# Patient Record
Sex: MALE | Race: BLACK OR AFRICAN AMERICAN | Employment: FULL TIME
[De-identification: names, ages, dates, MRNs, and addresses within clinical notes are randomized per-mention and may not be internally consistent; named-entity substitution may affect disease eponyms.]

---

## 2017-01-16 RX ORDER — TIZANIDINE 4 MG/1
TABLET ORAL
Qty: 90 TABLET | Refills: 0 | Status: SHIPPED | OUTPATIENT
Start: 2017-01-16 | End: 2017-02-19 | Stop reason: SDUPTHER

## 2017-01-17 ENCOUNTER — OFFICE VISIT (OUTPATIENT)
Dept: FAMILY MEDICINE CLINIC | Facility: CLINIC | Age: 62
End: 2017-01-17

## 2017-01-17 VITALS
HEIGHT: 72 IN | SYSTOLIC BLOOD PRESSURE: 134 MMHG | DIASTOLIC BLOOD PRESSURE: 71 MMHG | WEIGHT: 215 LBS | BODY MASS INDEX: 29.12 KG/M2 | HEART RATE: 84 BPM

## 2017-01-17 DIAGNOSIS — R26.81 UNSTABLE GAIT: ICD-10-CM

## 2017-01-17 DIAGNOSIS — Z12.5 SPECIAL SCREENING FOR MALIGNANT NEOPLASM OF PROSTATE: ICD-10-CM

## 2017-01-17 DIAGNOSIS — G47.9 SLEEP DISORDER: ICD-10-CM

## 2017-01-17 DIAGNOSIS — E11.9 TYPE 2 DIABETES MELLITUS WITHOUT COMPLICATION, WITHOUT LONG-TERM CURRENT USE OF INSULIN (HCC): Primary | ICD-10-CM

## 2017-01-17 DIAGNOSIS — G89.4 CHRONIC PAIN SYNDROME: ICD-10-CM

## 2017-01-17 DIAGNOSIS — E78.2 MIXED HYPERLIPIDEMIA: ICD-10-CM

## 2017-01-17 DIAGNOSIS — I10 ESSENTIAL HYPERTENSION: ICD-10-CM

## 2017-01-17 DIAGNOSIS — S82.851S TRIMALLEOLAR FRACTURE OF RIGHT ANKLE, SEQUELA: ICD-10-CM

## 2017-01-17 PROCEDURE — 99214 OFFICE O/P EST MOD 30 MIN: CPT | Performed by: FAMILY MEDICINE

## 2017-01-17 PROCEDURE — G8484 FLU IMMUNIZE NO ADMIN: HCPCS | Performed by: FAMILY MEDICINE

## 2017-01-17 PROCEDURE — 3017F COLORECTAL CA SCREEN DOC REV: CPT | Performed by: FAMILY MEDICINE

## 2017-01-17 PROCEDURE — 1036F TOBACCO NON-USER: CPT | Performed by: FAMILY MEDICINE

## 2017-01-17 PROCEDURE — 3044F HG A1C LEVEL LT 7.0%: CPT | Performed by: FAMILY MEDICINE

## 2017-01-17 PROCEDURE — G8427 DOCREV CUR MEDS BY ELIG CLIN: HCPCS | Performed by: FAMILY MEDICINE

## 2017-01-17 PROCEDURE — G8419 CALC BMI OUT NRM PARAM NOF/U: HCPCS | Performed by: FAMILY MEDICINE

## 2017-01-17 RX ORDER — ZOLPIDEM TARTRATE 10 MG/1
10 TABLET ORAL NIGHTLY PRN
Qty: 30 TABLET | Refills: 0 | Status: SHIPPED | OUTPATIENT
Start: 2017-01-17 | End: 2017-04-18 | Stop reason: SDUPTHER

## 2017-02-03 ASSESSMENT — ENCOUNTER SYMPTOMS
SORE THROAT: 0
CONSTIPATION: 0
ABDOMINAL PAIN: 0
SHORTNESS OF BREATH: 0
DIARRHEA: 0
TROUBLE SWALLOWING: 0
RHINORRHEA: 0
NAUSEA: 0
COUGH: 0
CHEST TIGHTNESS: 0
BACK PAIN: 0

## 2017-02-08 ENCOUNTER — OFFICE VISIT (OUTPATIENT)
Dept: FAMILY MEDICINE CLINIC | Facility: CLINIC | Age: 62
End: 2017-02-08

## 2017-02-08 VITALS
DIASTOLIC BLOOD PRESSURE: 100 MMHG | SYSTOLIC BLOOD PRESSURE: 200 MMHG | HEART RATE: 90 BPM | BODY MASS INDEX: 28.71 KG/M2 | WEIGHT: 212 LBS | TEMPERATURE: 98.6 F | HEIGHT: 72 IN

## 2017-02-08 DIAGNOSIS — H92.02 OTALGIA OF LEFT EAR: ICD-10-CM

## 2017-02-08 DIAGNOSIS — E86.0 DEHYDRATION, MILD: ICD-10-CM

## 2017-02-08 DIAGNOSIS — J01.90 ACUTE RHINOSINUSITIS: ICD-10-CM

## 2017-02-08 DIAGNOSIS — J31.0: Primary | ICD-10-CM

## 2017-02-08 PROCEDURE — G8419 CALC BMI OUT NRM PARAM NOF/U: HCPCS | Performed by: FAMILY MEDICINE

## 2017-02-08 PROCEDURE — 1036F TOBACCO NON-USER: CPT | Performed by: FAMILY MEDICINE

## 2017-02-08 PROCEDURE — G8427 DOCREV CUR MEDS BY ELIG CLIN: HCPCS | Performed by: FAMILY MEDICINE

## 2017-02-08 PROCEDURE — G8484 FLU IMMUNIZE NO ADMIN: HCPCS | Performed by: FAMILY MEDICINE

## 2017-02-08 PROCEDURE — 99213 OFFICE O/P EST LOW 20 MIN: CPT | Performed by: FAMILY MEDICINE

## 2017-02-08 PROCEDURE — 3017F COLORECTAL CA SCREEN DOC REV: CPT | Performed by: FAMILY MEDICINE

## 2017-02-08 RX ORDER — OXYMETAZOLINE HYDROCHLORIDE 0.05 G/100ML
1 SPRAY NASAL 2 TIMES DAILY
Qty: 15 ML | Refills: 0 | Status: SHIPPED | OUTPATIENT
Start: 2017-02-08 | End: 2018-02-08

## 2017-02-08 RX ORDER — AMOXICILLIN AND CLAVULANATE POTASSIUM 875; 125 MG/1; MG/1
1 TABLET, FILM COATED ORAL 2 TIMES DAILY
Qty: 20 TABLET | Refills: 1 | Status: SHIPPED | OUTPATIENT
Start: 2017-02-08 | End: 2017-02-18

## 2017-02-08 ASSESSMENT — ENCOUNTER SYMPTOMS
RHINORRHEA: 1
COUGH: 0
NAUSEA: 0
DIARRHEA: 0
BACK PAIN: 1
FACIAL SWELLING: 1
SORE THROAT: 0
CONSTIPATION: 0
VOICE CHANGE: 0
SINUS PRESSURE: 1
ABDOMINAL PAIN: 0
TROUBLE SWALLOWING: 0
SHORTNESS OF BREATH: 0

## 2017-02-08 ASSESSMENT — PATIENT HEALTH QUESTIONNAIRE - PHQ9
2. FEELING DOWN, DEPRESSED OR HOPELESS: 0
1. LITTLE INTEREST OR PLEASURE IN DOING THINGS: 0
SUM OF ALL RESPONSES TO PHQ QUESTIONS 1-9: 0
SUM OF ALL RESPONSES TO PHQ9 QUESTIONS 1 & 2: 0

## 2017-02-21 RX ORDER — TIZANIDINE 4 MG/1
TABLET ORAL
Qty: 90 TABLET | Refills: 0 | Status: SHIPPED | OUTPATIENT
Start: 2017-02-21 | End: 2017-03-21 | Stop reason: SDUPTHER

## 2017-02-21 RX ORDER — AMLODIPINE BESYLATE 5 MG/1
TABLET ORAL
Qty: 90 TABLET | Refills: 2 | Status: SHIPPED | OUTPATIENT
Start: 2017-02-21 | End: 2018-04-23 | Stop reason: SDUPTHER

## 2017-03-21 RX ORDER — TIZANIDINE 4 MG/1
TABLET ORAL
Qty: 90 TABLET | Refills: 0 | Status: SHIPPED | OUTPATIENT
Start: 2017-03-21 | End: 2017-04-17 | Stop reason: CLARIF

## 2017-03-23 RX ORDER — LISINOPRIL 20 MG/1
TABLET ORAL
Qty: 240 TABLET | Refills: 3 | Status: SHIPPED | OUTPATIENT
Start: 2017-03-23 | End: 2018-04-23 | Stop reason: SDUPTHER

## 2017-04-17 ENCOUNTER — APPOINTMENT (OUTPATIENT)
Dept: GENERAL RADIOLOGY | Age: 62
End: 2017-04-17
Payer: COMMERCIAL

## 2017-04-17 ENCOUNTER — HOSPITAL ENCOUNTER (EMERGENCY)
Age: 62
Discharge: HOME | End: 2017-04-17
Attending: EMERGENCY MEDICINE
Payer: COMMERCIAL

## 2017-04-17 ENCOUNTER — APPOINTMENT (OUTPATIENT)
Dept: CT IMAGING | Age: 62
End: 2017-04-17
Payer: COMMERCIAL

## 2017-04-17 VITALS
SYSTOLIC BLOOD PRESSURE: 170 MMHG | HEART RATE: 62 BPM | TEMPERATURE: 97.7 F | RESPIRATION RATE: 18 BRPM | DIASTOLIC BLOOD PRESSURE: 71 MMHG | BODY MASS INDEX: 29.39 KG/M2 | OXYGEN SATURATION: 100 % | WEIGHT: 217 LBS | HEIGHT: 72 IN

## 2017-04-17 DIAGNOSIS — M54.50 LOW BACK PAIN WITHOUT SCIATICA, UNSPECIFIED BACK PAIN LATERALITY, UNSPECIFIED CHRONICITY: ICD-10-CM

## 2017-04-17 DIAGNOSIS — I10 ESSENTIAL HYPERTENSION: Primary | ICD-10-CM

## 2017-04-17 LAB
ABSOLUTE EOS #: 0 K/UL (ref 0–0.4)
ABSOLUTE LYMPH #: 0.9 K/UL (ref 1–4.8)
ABSOLUTE MONO #: 0.3 K/UL (ref 0.2–0.8)
ANION GAP SERPL CALCULATED.3IONS-SCNC: 14 MMOL/L (ref 9–17)
BASOPHILS # BLD: 0 % (ref 0–2)
BASOPHILS ABSOLUTE: 0 K/UL (ref 0–0.2)
BILIRUBIN URINE: NEGATIVE
BUN BLDV-MCNC: 11 MG/DL (ref 8–23)
BUN/CREAT BLD: 14 (ref 9–20)
CALCIUM SERPL-MCNC: 9.5 MG/DL (ref 8.6–10.4)
CHLORIDE BLD-SCNC: 98 MMOL/L (ref 98–107)
CHP ED QC CHECK: NORMAL
CO2: 26 MMOL/L (ref 20–31)
COLOR: YELLOW
COMMENT UA: ABNORMAL
CREAT SERPL-MCNC: 0.79 MG/DL (ref 0.7–1.2)
DIFFERENTIAL TYPE: ABNORMAL
EOSINOPHILS RELATIVE PERCENT: 0 % (ref 1–4)
GFR AFRICAN AMERICAN: >60 ML/MIN
GFR NON-AFRICAN AMERICAN: >60 ML/MIN
GFR SERPL CREATININE-BSD FRML MDRD: ABNORMAL ML/MIN/{1.73_M2}
GFR SERPL CREATININE-BSD FRML MDRD: ABNORMAL ML/MIN/{1.73_M2}
GLUCOSE BLD-MCNC: 176 MG/DL (ref 70–99)
GLUCOSE URINE: ABNORMAL
HCT VFR BLD CALC: 40.2 % (ref 41–53)
HEMOGLOBIN: 13.6 G/DL (ref 13.5–17.5)
KETONES, URINE: NEGATIVE
LEUKOCYTE ESTERASE, URINE: NEGATIVE
LYMPHOCYTES # BLD: 9 % (ref 24–44)
MCH RBC QN AUTO: 28.2 PG (ref 26–34)
MCHC RBC AUTO-ENTMCNC: 33.7 G/DL (ref 31–37)
MCV RBC AUTO: 83.6 FL (ref 80–100)
MONOCYTES # BLD: 3 % (ref 1–7)
NITRITE, URINE: NEGATIVE
PDW BLD-RTO: 12.7 % (ref 11.5–14.5)
PH UA: 5.5 (ref 5–8)
PLATELET # BLD: 283 K/UL (ref 130–400)
PLATELET ESTIMATE: ABNORMAL
PMV BLD AUTO: 7.7 FL (ref 6–12)
POTASSIUM SERPL-SCNC: 3.7 MMOL/L (ref 3.7–5.3)
PROTEIN UA: NEGATIVE
RBC # BLD: 4.81 M/UL (ref 4.5–5.9)
RBC # BLD: ABNORMAL 10*6/UL
SEG NEUTROPHILS: 88 % (ref 36–66)
SEGMENTED NEUTROPHILS ABSOLUTE COUNT: 8.1 K/UL (ref 1.8–7.7)
SODIUM BLD-SCNC: 138 MMOL/L (ref 135–144)
SPECIFIC GRAVITY UA: 1.02 (ref 1–1.03)
TURBIDITY: CLEAR
URINE HGB: NEGATIVE
UROBILINOGEN, URINE: NORMAL
WBC # BLD: 9.2 K/UL (ref 3.5–11)
WBC # BLD: ABNORMAL 10*3/UL

## 2017-04-17 PROCEDURE — 74176 CT ABD & PELVIS W/O CONTRAST: CPT

## 2017-04-17 PROCEDURE — 6360000002 HC RX W HCPCS: Performed by: PHYSICIAN ASSISTANT

## 2017-04-17 PROCEDURE — 85025 COMPLETE CBC W/AUTO DIFF WBC: CPT

## 2017-04-17 PROCEDURE — 96375 TX/PRO/DX INJ NEW DRUG ADDON: CPT

## 2017-04-17 PROCEDURE — 96374 THER/PROPH/DIAG INJ IV PUSH: CPT

## 2017-04-17 PROCEDURE — 80048 BASIC METABOLIC PNL TOTAL CA: CPT

## 2017-04-17 PROCEDURE — 96376 TX/PRO/DX INJ SAME DRUG ADON: CPT

## 2017-04-17 PROCEDURE — 72100 X-RAY EXAM L-S SPINE 2/3 VWS: CPT

## 2017-04-17 PROCEDURE — 99284 EMERGENCY DEPT VISIT MOD MDM: CPT

## 2017-04-17 RX ORDER — ONDANSETRON 2 MG/ML
4 INJECTION INTRAMUSCULAR; INTRAVENOUS ONCE
Status: COMPLETED | OUTPATIENT
Start: 2017-04-17 | End: 2017-04-17

## 2017-04-17 RX ORDER — HYDRALAZINE HYDROCHLORIDE 20 MG/ML
10 INJECTION INTRAMUSCULAR; INTRAVENOUS ONCE
Status: COMPLETED | OUTPATIENT
Start: 2017-04-17 | End: 2017-04-17

## 2017-04-17 RX ORDER — METHOCARBAMOL 750 MG/1
750 TABLET, FILM COATED ORAL 4 TIMES DAILY
Qty: 40 TABLET | Refills: 0 | Status: SHIPPED | OUTPATIENT
Start: 2017-04-17 | End: 2017-04-27

## 2017-04-17 RX ORDER — HYDRALAZINE HYDROCHLORIDE 20 MG/ML
10 INJECTION INTRAMUSCULAR; INTRAVENOUS EVERY 6 HOURS PRN
Status: DISCONTINUED | OUTPATIENT
Start: 2017-04-17 | End: 2017-04-17

## 2017-04-17 RX ADMIN — HYDROMORPHONE HYDROCHLORIDE 1 MG: 1 INJECTION, SOLUTION INTRAMUSCULAR; INTRAVENOUS; SUBCUTANEOUS at 12:20

## 2017-04-17 RX ADMIN — HYDROMORPHONE HYDROCHLORIDE 1 MG: 1 INJECTION, SOLUTION INTRAMUSCULAR; INTRAVENOUS; SUBCUTANEOUS at 11:48

## 2017-04-17 RX ADMIN — ONDANSETRON 4 MG: 2 INJECTION INTRAMUSCULAR; INTRAVENOUS at 11:48

## 2017-04-17 RX ADMIN — HYDRALAZINE HYDROCHLORIDE 10 MG: 20 INJECTION INTRAMUSCULAR; INTRAVENOUS at 13:02

## 2017-04-17 ASSESSMENT — PAIN SCALES - GENERAL
PAINLEVEL_OUTOF10: 5
PAINLEVEL_OUTOF10: 7
PAINLEVEL_OUTOF10: 10

## 2017-04-18 ENCOUNTER — OFFICE VISIT (OUTPATIENT)
Dept: FAMILY MEDICINE CLINIC | Age: 62
End: 2017-04-18
Payer: COMMERCIAL

## 2017-04-18 VITALS
WEIGHT: 205 LBS | BODY MASS INDEX: 27.77 KG/M2 | DIASTOLIC BLOOD PRESSURE: 80 MMHG | SYSTOLIC BLOOD PRESSURE: 140 MMHG | HEART RATE: 84 BPM | HEIGHT: 72 IN

## 2017-04-18 DIAGNOSIS — M54.42 ACUTE LEFT-SIDED LOW BACK PAIN WITH LEFT-SIDED SCIATICA: Primary | ICD-10-CM

## 2017-04-18 DIAGNOSIS — E11.9 TYPE 2 DIABETES MELLITUS WITHOUT COMPLICATION, WITHOUT LONG-TERM CURRENT USE OF INSULIN (HCC): ICD-10-CM

## 2017-04-18 DIAGNOSIS — I10 ESSENTIAL HYPERTENSION: ICD-10-CM

## 2017-04-18 DIAGNOSIS — M47.817 DJD (DEGENERATIVE JOINT DISEASE), LUMBOSACRAL: ICD-10-CM

## 2017-04-18 DIAGNOSIS — F41.9 ANXIETY: ICD-10-CM

## 2017-04-18 DIAGNOSIS — G47.9 SLEEP DISORDER: ICD-10-CM

## 2017-04-18 DIAGNOSIS — Z23 NEED FOR PNEUMOCOCCAL VACCINATION: ICD-10-CM

## 2017-04-18 LAB — HBA1C MFR BLD: 5.7 %

## 2017-04-18 PROCEDURE — 1036F TOBACCO NON-USER: CPT | Performed by: FAMILY MEDICINE

## 2017-04-18 PROCEDURE — G8419 CALC BMI OUT NRM PARAM NOF/U: HCPCS | Performed by: FAMILY MEDICINE

## 2017-04-18 PROCEDURE — G8427 DOCREV CUR MEDS BY ELIG CLIN: HCPCS | Performed by: FAMILY MEDICINE

## 2017-04-18 PROCEDURE — 90471 IMMUNIZATION ADMIN: CPT | Performed by: FAMILY MEDICINE

## 2017-04-18 PROCEDURE — 3044F HG A1C LEVEL LT 7.0%: CPT | Performed by: FAMILY MEDICINE

## 2017-04-18 PROCEDURE — 3017F COLORECTAL CA SCREEN DOC REV: CPT | Performed by: FAMILY MEDICINE

## 2017-04-18 PROCEDURE — 83036 HEMOGLOBIN GLYCOSYLATED A1C: CPT | Performed by: FAMILY MEDICINE

## 2017-04-18 PROCEDURE — 99214 OFFICE O/P EST MOD 30 MIN: CPT | Performed by: FAMILY MEDICINE

## 2017-04-18 PROCEDURE — 90732 PPSV23 VACC 2 YRS+ SUBQ/IM: CPT | Performed by: FAMILY MEDICINE

## 2017-04-18 RX ORDER — ZOLPIDEM TARTRATE 10 MG/1
10 TABLET ORAL NIGHTLY PRN
Qty: 30 TABLET | Refills: 1 | Status: SHIPPED | OUTPATIENT
Start: 2017-04-18 | End: 2017-06-19 | Stop reason: SDUPTHER

## 2017-04-18 RX ORDER — IBUPROFEN 800 MG/1
800 TABLET ORAL EVERY 8 HOURS PRN
Qty: 90 TABLET | Refills: 3 | Status: SHIPPED | OUTPATIENT
Start: 2017-04-18 | End: 2019-03-13 | Stop reason: ALTCHOICE

## 2017-04-18 ASSESSMENT — PATIENT HEALTH QUESTIONNAIRE - PHQ9
1. LITTLE INTEREST OR PLEASURE IN DOING THINGS: 0
SUM OF ALL RESPONSES TO PHQ9 QUESTIONS 1 & 2: 0
2. FEELING DOWN, DEPRESSED OR HOPELESS: 0
SUM OF ALL RESPONSES TO PHQ QUESTIONS 1-9: 0

## 2017-04-19 DIAGNOSIS — M54.42 ACUTE LEFT-SIDED LOW BACK PAIN WITH LEFT-SIDED SCIATICA: Primary | ICD-10-CM

## 2017-04-23 RX ORDER — TIZANIDINE 4 MG/1
TABLET ORAL
Qty: 90 TABLET | Refills: 1 | Status: SHIPPED | OUTPATIENT
Start: 2017-04-23 | End: 2018-04-23 | Stop reason: SDUPTHER

## 2017-04-23 ASSESSMENT — ENCOUNTER SYMPTOMS
RHINORRHEA: 0
DIARRHEA: 0
SHORTNESS OF BREATH: 0
NAUSEA: 0
ABDOMINAL PAIN: 0
TROUBLE SWALLOWING: 0
CHEST TIGHTNESS: 0
CONSTIPATION: 0
BACK PAIN: 1
SORE THROAT: 0
COUGH: 0

## 2017-05-27 DIAGNOSIS — M70.22 OLECRANON BURSITIS OF LEFT ELBOW: ICD-10-CM

## 2017-05-27 DIAGNOSIS — M47.817 DJD (DEGENERATIVE JOINT DISEASE), LUMBOSACRAL: ICD-10-CM

## 2017-05-27 DIAGNOSIS — G89.4 CHRONIC PAIN SYNDROME: ICD-10-CM

## 2017-05-27 DIAGNOSIS — S82.851S TRIMALLEOLAR FRACTURE OF RIGHT ANKLE, SEQUELA: ICD-10-CM

## 2017-05-28 RX ORDER — GABAPENTIN 600 MG/1
TABLET ORAL
Qty: 90 TABLET | Refills: 3 | Status: SHIPPED | OUTPATIENT
Start: 2017-05-28 | End: 2018-04-23 | Stop reason: SDUPTHER

## 2017-06-08 DIAGNOSIS — E11.9 TYPE 2 DIABETES MELLITUS WITHOUT COMPLICATION (HCC): ICD-10-CM

## 2017-06-19 ENCOUNTER — OFFICE VISIT (OUTPATIENT)
Dept: FAMILY MEDICINE CLINIC | Age: 62
End: 2017-06-19
Payer: COMMERCIAL

## 2017-06-19 VITALS
BODY MASS INDEX: 27.14 KG/M2 | DIASTOLIC BLOOD PRESSURE: 70 MMHG | WEIGHT: 200.4 LBS | SYSTOLIC BLOOD PRESSURE: 130 MMHG | HEIGHT: 72 IN | HEART RATE: 76 BPM

## 2017-06-19 DIAGNOSIS — S82.851G: Primary | ICD-10-CM

## 2017-06-19 DIAGNOSIS — G62.9 NEUROPATHY: ICD-10-CM

## 2017-06-19 DIAGNOSIS — R26.81 UNSTABLE GAIT: ICD-10-CM

## 2017-06-19 DIAGNOSIS — M47.817 DJD (DEGENERATIVE JOINT DISEASE), LUMBOSACRAL: ICD-10-CM

## 2017-06-19 DIAGNOSIS — G47.9 SLEEP DISORDER: ICD-10-CM

## 2017-06-19 DIAGNOSIS — I10 ESSENTIAL HYPERTENSION: ICD-10-CM

## 2017-06-19 DIAGNOSIS — E11.9 TYPE 2 DIABETES MELLITUS WITHOUT COMPLICATION, WITHOUT LONG-TERM CURRENT USE OF INSULIN (HCC): ICD-10-CM

## 2017-06-19 DIAGNOSIS — Z12.5 SPECIAL SCREENING FOR MALIGNANT NEOPLASM OF PROSTATE: ICD-10-CM

## 2017-06-19 DIAGNOSIS — E78.2 MIXED HYPERLIPIDEMIA: ICD-10-CM

## 2017-06-19 DIAGNOSIS — G89.4 CHRONIC PAIN SYNDROME: ICD-10-CM

## 2017-06-19 PROCEDURE — 99213 OFFICE O/P EST LOW 20 MIN: CPT | Performed by: FAMILY MEDICINE

## 2017-06-19 PROCEDURE — 1036F TOBACCO NON-USER: CPT | Performed by: FAMILY MEDICINE

## 2017-06-19 PROCEDURE — 3046F HEMOGLOBIN A1C LEVEL >9.0%: CPT | Performed by: FAMILY MEDICINE

## 2017-06-19 PROCEDURE — G8419 CALC BMI OUT NRM PARAM NOF/U: HCPCS | Performed by: FAMILY MEDICINE

## 2017-06-19 PROCEDURE — G8427 DOCREV CUR MEDS BY ELIG CLIN: HCPCS | Performed by: FAMILY MEDICINE

## 2017-06-19 PROCEDURE — 3017F COLORECTAL CA SCREEN DOC REV: CPT | Performed by: FAMILY MEDICINE

## 2017-06-19 RX ORDER — ZOLPIDEM TARTRATE 10 MG/1
10 TABLET ORAL NIGHTLY PRN
Qty: 30 TABLET | Refills: 1 | Status: SHIPPED | OUTPATIENT
Start: 2017-06-19 | End: 2017-09-11 | Stop reason: SDUPTHER

## 2017-06-19 ASSESSMENT — PATIENT HEALTH QUESTIONNAIRE - PHQ9
SUM OF ALL RESPONSES TO PHQ9 QUESTIONS 1 & 2: 0
2. FEELING DOWN, DEPRESSED OR HOPELESS: 0
1. LITTLE INTEREST OR PLEASURE IN DOING THINGS: 0
SUM OF ALL RESPONSES TO PHQ QUESTIONS 1-9: 0

## 2017-06-29 RX ORDER — AMOXICILLIN AND CLAVULANATE POTASSIUM 875; 125 MG/1; MG/1
TABLET, FILM COATED ORAL
Qty: 20 TABLET | Refills: 0 | Status: SHIPPED | OUTPATIENT
Start: 2017-06-29 | End: 2018-04-23 | Stop reason: SDUPTHER

## 2017-07-03 DIAGNOSIS — M54.42 ACUTE LEFT-SIDED LOW BACK PAIN WITH LEFT-SIDED SCIATICA: Primary | ICD-10-CM

## 2017-07-13 ASSESSMENT — ENCOUNTER SYMPTOMS
TROUBLE SWALLOWING: 0
NAUSEA: 0
ABDOMINAL PAIN: 0
SHORTNESS OF BREATH: 1
CONSTIPATION: 0
DIARRHEA: 0
SORE THROAT: 0
BACK PAIN: 0
RHINORRHEA: 0
COUGH: 0
CHEST TIGHTNESS: 0

## 2017-09-11 DIAGNOSIS — G47.9 SLEEP DISORDER: ICD-10-CM

## 2017-09-11 RX ORDER — ZOLPIDEM TARTRATE 10 MG/1
TABLET ORAL
Qty: 30 TABLET | Refills: 0 | Status: SHIPPED | OUTPATIENT
Start: 2017-09-11 | End: 2017-12-18 | Stop reason: SDUPTHER

## 2017-09-25 ENCOUNTER — OFFICE VISIT (OUTPATIENT)
Dept: FAMILY MEDICINE CLINIC | Age: 62
End: 2017-09-25
Payer: COMMERCIAL

## 2017-09-25 VITALS
BODY MASS INDEX: 28.01 KG/M2 | HEIGHT: 72 IN | DIASTOLIC BLOOD PRESSURE: 68 MMHG | TEMPERATURE: 98 F | WEIGHT: 206.8 LBS | RESPIRATION RATE: 16 BRPM | SYSTOLIC BLOOD PRESSURE: 132 MMHG | HEART RATE: 80 BPM

## 2017-09-25 DIAGNOSIS — I10 ESSENTIAL HYPERTENSION: Primary | ICD-10-CM

## 2017-09-25 DIAGNOSIS — M47.817 DJD (DEGENERATIVE JOINT DISEASE), LUMBOSACRAL: ICD-10-CM

## 2017-09-25 DIAGNOSIS — R26.81 UNSTABLE GAIT: ICD-10-CM

## 2017-09-25 DIAGNOSIS — E11.9 TYPE 2 DIABETES MELLITUS WITHOUT COMPLICATION, WITHOUT LONG-TERM CURRENT USE OF INSULIN (HCC): ICD-10-CM

## 2017-09-25 DIAGNOSIS — S82.851S TRIMALLEOLAR FRACTURE OF RIGHT ANKLE, SEQUELA: ICD-10-CM

## 2017-09-25 DIAGNOSIS — E78.2 MIXED HYPERLIPIDEMIA: ICD-10-CM

## 2017-09-25 DIAGNOSIS — Z12.5 SPECIAL SCREENING FOR MALIGNANT NEOPLASM OF PROSTATE: ICD-10-CM

## 2017-09-25 PROCEDURE — 1036F TOBACCO NON-USER: CPT | Performed by: FAMILY MEDICINE

## 2017-09-25 PROCEDURE — 3046F HEMOGLOBIN A1C LEVEL >9.0%: CPT | Performed by: FAMILY MEDICINE

## 2017-09-25 PROCEDURE — 3017F COLORECTAL CA SCREEN DOC REV: CPT | Performed by: FAMILY MEDICINE

## 2017-09-25 PROCEDURE — 99214 OFFICE O/P EST MOD 30 MIN: CPT | Performed by: FAMILY MEDICINE

## 2017-09-25 PROCEDURE — G8427 DOCREV CUR MEDS BY ELIG CLIN: HCPCS | Performed by: FAMILY MEDICINE

## 2017-09-25 PROCEDURE — G8417 CALC BMI ABV UP PARAM F/U: HCPCS | Performed by: FAMILY MEDICINE

## 2017-09-25 RX ORDER — MELOXICAM 15 MG/1
15 TABLET ORAL DAILY
Qty: 30 TABLET | Refills: 3 | Status: SHIPPED | OUTPATIENT
Start: 2017-09-25 | End: 2019-03-13 | Stop reason: ALTCHOICE

## 2017-09-25 ASSESSMENT — ENCOUNTER SYMPTOMS
TROUBLE SWALLOWING: 0
DIARRHEA: 0
SHORTNESS OF BREATH: 0
BACK PAIN: 1
ABDOMINAL PAIN: 0
CONSTIPATION: 0
SORE THROAT: 0
COUGH: 0
CHEST TIGHTNESS: 0
RHINORRHEA: 0
NAUSEA: 0

## 2017-10-21 ENCOUNTER — HOSPITAL ENCOUNTER (OUTPATIENT)
Age: 62
Discharge: HOME | End: 2017-10-21
Payer: COMMERCIAL

## 2017-10-21 DIAGNOSIS — E11.9 TYPE 2 DIABETES MELLITUS WITHOUT COMPLICATION, WITHOUT LONG-TERM CURRENT USE OF INSULIN (HCC): ICD-10-CM

## 2017-10-21 DIAGNOSIS — Z12.5 SPECIAL SCREENING FOR MALIGNANT NEOPLASM OF PROSTATE: ICD-10-CM

## 2017-10-21 DIAGNOSIS — I10 ESSENTIAL HYPERTENSION: ICD-10-CM

## 2017-10-21 DIAGNOSIS — E78.2 MIXED HYPERLIPIDEMIA: ICD-10-CM

## 2017-10-21 LAB
ABSOLUTE EOS #: 0.1 K/UL (ref 0–0.4)
ABSOLUTE IMMATURE GRANULOCYTE: ABNORMAL K/UL (ref 0–0.3)
ABSOLUTE LYMPH #: 1.2 K/UL (ref 1–4.8)
ABSOLUTE MONO #: 0.4 K/UL (ref 0.2–0.8)
ALBUMIN SERPL-MCNC: 4.2 G/DL (ref 3.5–5.2)
ALBUMIN/GLOBULIN RATIO: ABNORMAL (ref 1–2.5)
ALP BLD-CCNC: 72 U/L (ref 40–129)
ALT SERPL-CCNC: 15 U/L (ref 5–41)
ANION GAP SERPL CALCULATED.3IONS-SCNC: 13 MMOL/L (ref 9–17)
AST SERPL-CCNC: 19 U/L
BASOPHILS # BLD: 1 %
BASOPHILS ABSOLUTE: 0 K/UL (ref 0–0.2)
BILIRUB SERPL-MCNC: 0.68 MG/DL (ref 0.3–1.2)
BUN BLDV-MCNC: 11 MG/DL (ref 8–23)
BUN/CREAT BLD: 12 (ref 9–20)
CALCIUM SERPL-MCNC: 9.1 MG/DL (ref 8.6–10.4)
CHLORIDE BLD-SCNC: 101 MMOL/L (ref 98–107)
CHOLESTEROL/HDL RATIO: 3.9
CHOLESTEROL: 189 MG/DL
CO2: 27 MMOL/L (ref 20–31)
CREAT SERPL-MCNC: 0.94 MG/DL (ref 0.7–1.2)
CREATININE URINE: 126.9 MG/DL (ref 39–259)
DIFFERENTIAL TYPE: ABNORMAL
EOSINOPHILS RELATIVE PERCENT: 2 %
ESTIMATED AVERAGE GLUCOSE: 114 MG/DL
GFR AFRICAN AMERICAN: >60 ML/MIN
GFR NON-AFRICAN AMERICAN: >60 ML/MIN
GFR SERPL CREATININE-BSD FRML MDRD: ABNORMAL ML/MIN/{1.73_M2}
GFR SERPL CREATININE-BSD FRML MDRD: ABNORMAL ML/MIN/{1.73_M2}
GLUCOSE BLD-MCNC: 117 MG/DL (ref 70–99)
HBA1C MFR BLD: 5.6 % (ref 4–6)
HCT VFR BLD CALC: 40.9 % (ref 41–53)
HDLC SERPL-MCNC: 49 MG/DL
HEMOGLOBIN: 13.6 G/DL (ref 13.5–17.5)
IMMATURE GRANULOCYTES: ABNORMAL %
LDL CHOLESTEROL: 118 MG/DL (ref 0–130)
LYMPHOCYTES # BLD: 20 %
MCH RBC QN AUTO: 27.9 PG (ref 26–34)
MCHC RBC AUTO-ENTMCNC: 33.2 G/DL (ref 31–37)
MCV RBC AUTO: 84 FL (ref 80–100)
MICROALBUMIN/CREAT 24H UR: <12 MG/L
MICROALBUMIN/CREAT UR-RTO: 9 MCG/MG CREAT
MONOCYTES # BLD: 6 %
PDW BLD-RTO: 12.7 % (ref 11.5–14.5)
PLATELET # BLD: 298 K/UL (ref 130–400)
PLATELET ESTIMATE: ABNORMAL
PMV BLD AUTO: 7.8 FL (ref 6–12)
POTASSIUM SERPL-SCNC: 4.3 MMOL/L (ref 3.7–5.3)
PROSTATE SPECIFIC ANTIGEN: 2.89 UG/L
RBC # BLD: 4.86 M/UL (ref 4.5–5.9)
RBC # BLD: ABNORMAL 10*6/UL
SEG NEUTROPHILS: 71 %
SEGMENTED NEUTROPHILS ABSOLUTE COUNT: 4.4 K/UL (ref 1.8–7.7)
SODIUM BLD-SCNC: 141 MMOL/L (ref 135–144)
TOTAL PROTEIN: 7.3 G/DL (ref 6.4–8.3)
TRIGL SERPL-MCNC: 111 MG/DL
VLDLC SERPL CALC-MCNC: NORMAL MG/DL (ref 1–30)
WBC # BLD: 6.2 K/UL (ref 3.5–11)
WBC # BLD: ABNORMAL 10*3/UL

## 2017-10-21 PROCEDURE — 82570 ASSAY OF URINE CREATININE: CPT

## 2017-10-21 PROCEDURE — 85025 COMPLETE CBC W/AUTO DIFF WBC: CPT

## 2017-10-21 PROCEDURE — 80053 COMPREHEN METABOLIC PANEL: CPT

## 2017-10-21 PROCEDURE — 80061 LIPID PANEL: CPT

## 2017-10-21 PROCEDURE — 82043 UR ALBUMIN QUANTITATIVE: CPT

## 2017-10-21 PROCEDURE — 83036 HEMOGLOBIN GLYCOSYLATED A1C: CPT

## 2017-10-21 PROCEDURE — 36415 COLL VENOUS BLD VENIPUNCTURE: CPT

## 2017-10-21 PROCEDURE — G0103 PSA SCREENING: HCPCS

## 2017-10-31 RX ORDER — OMEPRAZOLE 20 MG/1
20 CAPSULE, DELAYED RELEASE ORAL 2 TIMES DAILY
Qty: 60 CAPSULE | Refills: 3 | Status: SHIPPED | OUTPATIENT
Start: 2017-10-31 | End: 2018-03-06 | Stop reason: SDUPTHER

## 2017-10-31 NOTE — TELEPHONE ENCOUNTER
of flank     Irritant dermatitis     Chronic pain syndrome     DJD (degenerative joint disease), thoracic     Obesity     ED (erectile dysfunction)     Neuropathy (HCC)     DJD (degenerative joint disease), lumbosacral     Anemia     Sleep disorder     Sprain, knee     Internal derangement of knee joint     Trimalleolar fracture of right ankle     Right knee sprain     Olecranon bursitis of left elbow     Unstable gait     Special screening for malignant neoplasm of prostate     Type 2 diabetes mellitus without complication (HCC)     Spondylosis of cervical region without myelopathy or radiculopathy     Essential hypertension     Mixed hyperlipidemia     Patient overweight     Incomplete tear of left rotator cuff     Obstructive rhinitis     Acute rhinosinusitis     Dehydration, mild     Acute left-sided low back pain with left-sided sciatica

## 2017-12-18 ENCOUNTER — OFFICE VISIT (OUTPATIENT)
Dept: FAMILY MEDICINE CLINIC | Age: 62
End: 2017-12-18
Payer: COMMERCIAL

## 2017-12-18 VITALS
WEIGHT: 215.4 LBS | HEART RATE: 80 BPM | BODY MASS INDEX: 29.17 KG/M2 | HEIGHT: 72 IN | DIASTOLIC BLOOD PRESSURE: 91 MMHG | SYSTOLIC BLOOD PRESSURE: 167 MMHG

## 2017-12-18 DIAGNOSIS — E78.2 MIXED HYPERLIPIDEMIA: ICD-10-CM

## 2017-12-18 DIAGNOSIS — J31.0: ICD-10-CM

## 2017-12-18 DIAGNOSIS — M54.42 ACUTE LEFT-SIDED LOW BACK PAIN WITH LEFT-SIDED SCIATICA: ICD-10-CM

## 2017-12-18 DIAGNOSIS — E66.3 PATIENT OVERWEIGHT: ICD-10-CM

## 2017-12-18 DIAGNOSIS — G47.9 SLEEP DISORDER: ICD-10-CM

## 2017-12-18 DIAGNOSIS — I10 ESSENTIAL HYPERTENSION: Primary | ICD-10-CM

## 2017-12-18 DIAGNOSIS — N52.9 ERECTILE DYSFUNCTION, UNSPECIFIED ERECTILE DYSFUNCTION TYPE: ICD-10-CM

## 2017-12-18 DIAGNOSIS — S82.851G CLOSED TRIMALLEOLAR FRACTURE OF RIGHT ANKLE WITH DELAYED HEALING, SUBSEQUENT ENCOUNTER: ICD-10-CM

## 2017-12-18 DIAGNOSIS — R09.81 NASAL SINUS CONGESTION: ICD-10-CM

## 2017-12-18 DIAGNOSIS — E11.9 TYPE 2 DIABETES MELLITUS WITHOUT COMPLICATION, WITHOUT LONG-TERM CURRENT USE OF INSULIN (HCC): ICD-10-CM

## 2017-12-18 PROBLEM — S82.851A CLOSED TRIMALLEOLAR FRACTURE OF RIGHT ANKLE: Status: ACTIVE | Noted: 2017-12-18

## 2017-12-18 PROCEDURE — 3044F HG A1C LEVEL LT 7.0%: CPT | Performed by: FAMILY MEDICINE

## 2017-12-18 PROCEDURE — G8427 DOCREV CUR MEDS BY ELIG CLIN: HCPCS | Performed by: FAMILY MEDICINE

## 2017-12-18 PROCEDURE — G8484 FLU IMMUNIZE NO ADMIN: HCPCS | Performed by: FAMILY MEDICINE

## 2017-12-18 PROCEDURE — 1036F TOBACCO NON-USER: CPT | Performed by: FAMILY MEDICINE

## 2017-12-18 PROCEDURE — G8417 CALC BMI ABV UP PARAM F/U: HCPCS | Performed by: FAMILY MEDICINE

## 2017-12-18 PROCEDURE — 99214 OFFICE O/P EST MOD 30 MIN: CPT | Performed by: FAMILY MEDICINE

## 2017-12-18 PROCEDURE — 3017F COLORECTAL CA SCREEN DOC REV: CPT | Performed by: FAMILY MEDICINE

## 2017-12-18 RX ORDER — ZOLPIDEM TARTRATE 10 MG/1
5 TABLET ORAL NIGHTLY PRN
Qty: 30 TABLET | Refills: 1 | Status: SHIPPED | OUTPATIENT
Start: 2017-12-18 | End: 2018-04-23 | Stop reason: SDUPTHER

## 2017-12-18 RX ORDER — TADALAFIL 20 MG/1
20 TABLET ORAL PRN
Qty: 30 TABLET | Refills: 2 | Status: SHIPPED | OUTPATIENT
Start: 2017-12-18 | End: 2020-01-08

## 2017-12-18 ASSESSMENT — ENCOUNTER SYMPTOMS
CONSTIPATION: 0
COUGH: 0
TROUBLE SWALLOWING: 0
RHINORRHEA: 0
BACK PAIN: 1
SHORTNESS OF BREATH: 0
DIARRHEA: 0
NAUSEA: 0
CHEST TIGHTNESS: 0
ABDOMINAL PAIN: 0
SORE THROAT: 0

## 2017-12-18 NOTE — PATIENT INSTRUCTIONS
arms.  ¨ Lightheadedness or sudden weakness. ¨ A fast or irregular heartbeat. ? · You have symptoms of a stroke. These may include:  ¨ Sudden numbness, tingling, weakness, or loss of movement in your face, arm, or leg, especially on only one side of your body. ¨ Sudden vision changes. ¨ Sudden trouble speaking. ¨ Sudden confusion or trouble understanding simple statements. ¨ Sudden problems with walking or balance. ¨ A sudden, severe headache that is different from past headaches. ? · You have severe back or belly pain. ?Do not wait until your blood pressure comes down on its own. Get help right away. ?Call your doctor now or seek immediate care if:  ? · Your blood pressure is much higher than normal (such as 180/110 or higher), but you don't have symptoms. ? · You think high blood pressure is causing symptoms, such as:  ¨ Severe headache. ¨ Blurry vision. ? Watch closely for changes in your health, and be sure to contact your doctor if:  ? · Your blood pressure measures 140/90 or higher at least 2 times. That means the top number is 140 or higher or the bottom number is 90 or higher, or both. ? · You think you may be having side effects from your blood pressure medicine. ? · Your blood pressure is usually normal, but it goes above normal at least 2 times. Where can you learn more? Go to [email or web address] and sign in to your [employer] account. Enter Z763 in the Sc[employer]ledog box to learn more about \"High Blood Pressure: Care Instructions. \"     If you do not have an account, please click on the \"Sign Up Now\" link. Current as of: September 21, 2016  Content Version: 11.4  © 0334-9632 [employer]. Care instructions adapted under license by [facility] ([facility]).  If you have questions about a medical condition or this instruction, always ask your healthcare professional. Lauren Cary any warranty or liability for your use of this information.

## 2017-12-18 NOTE — PROGRESS NOTES
clear and moist. No oropharyngeal exudate. Nose  is congested and wet. Narrow air passages. Erythema plus. Some clear nasal discharge present. Throat is clear. Ears are clear. Eyes: Pupils are equal, round, and reactive to light. No scleral icterus. Neck: Normal range of motion. Neck supple. No JVD present. No thyromegaly present. Cardiovascular: Normal rate, regular rhythm and normal heart sounds. Exam reveals no gallop and no friction rub. No murmur heard. Pulmonary/Chest: Effort normal and breath sounds normal. He has no wheezes. He has no rales. Abdominal: Soft. Bowel sounds are normal. He exhibits no mass. There is no tenderness. Liver & spleen are not palpable. Musculoskeletal:        Lumbar back: Normal.   Legs show mild edema. Right ankle is diffusely enlarged. Scar of surgery is present. There is diffuse tenderness all around. Both knees move well. Mild tenderness is noted. There is medial and lateral joint line tenderness. He is able to lie down and sit up. This tenderness over the lumbar spine and paraspinal muscles. Tenderness extends down the right gluteal area. He moves his arms well.  is strong. He has a slow and slightly limping gait. Lymphadenopathy:     He has no cervical adenopathy. Neurological: He is alert and oriented to person, place, and time. He has normal reflexes. Coordination and gait normal.   Skin: Skin is warm and dry. No rash noted. Psychiatric: He has a normal mood and affect. His behavior is normal. Judgment and thought content normal.   Nursing note and vitals reviewed. BP (!) 167/91 (Site: Right Arm, Position: Sitting, Cuff Size: Large Adult)   Pulse 80   Ht 6' (1.829 m)   Wt 215 lb 6.4 oz (97.7 kg)   BMI 29.21 kg/m²     Assessment:      1. Essential hypertension     2. Mixed hyperlipidemia     3.  Type 2 diabetes mellitus without complication, without long-term current use of insulin (HCC)  metFORMIN (GLUCOPHAGE) 500 MG tablet 4. Obstructive rhinitis     5. Nasal sinus congestion     6. Acute left-sided low back pain with left-sided sciatica     7. Closed trimalleolar fracture of right ankle with delayed healing, subsequent encounter     8. Patient overweight     9. Sleep disorder  zolpidem (AMBIEN) 10 MG tablet   10. Erectile dysfunction, unspecified erectile dysfunction type  tadalafil (CIALIS) 20 MG tablet       Plan:      Return in about 3 months (around 3/18/2018). No orders of the defined types were placed in this encounter. Orders Placed This Encounter   Medications    zolpidem (AMBIEN) 10 MG tablet     Sig: Take 0.5 tablets by mouth nightly as needed for Sleep . Dispense:  30 tablet     Refill:  1     PT REQUEST    metFORMIN (GLUCOPHAGE) 500 MG tablet     Sig: Take 1 tablet by mouth 2 times daily (with meals)     Dispense:  180 tablet     Refill:  2    tadalafil (CIALIS) 20 MG tablet     Sig: Take 1 tablet by mouth as needed for Erectile Dysfunction     Dispense:  30 tablet     Refill:  2         Findings and/or pathophysiology discussed with patient. Plan of treatment discussed. Patient's medical problems were discussed with him. Findings were explained. Chart was evaluated. Available lab work and tests results were discussed. His medications were reviewed. Management of his sinus problems was discussed. He will use saline nasal spray. He will use steam inhalations. He will use Afrin nasal spray as directed. Management of his anxiety and stress was discussed. Patient is on Ambien for sleep. Health maintenance was discussed. Weight management was discussed. Diet and activity were discussed. Carb counting and calorie counting was discussed. Management of his diabetes was discussed. HbA1c is within normal limits. Patient will follow-up with his specialists. Also patient states he does not want to do any more surgery to his right ankle.     1.  Pool received counseling on the following healthy behaviors: nutrition and exercise  2. Patient given educational materials - see patient instructions  3. Was a self-tracking handout given in paper form or via [employer]? No  If yes, see orders or list here. 4.  Discussed use, benefit, and side effects of prescribed medications. Barriers to medication compliance addressed. All patient questions answered. Pt voiced understanding. 5.  Reviewed prior labs and health maintenance  6. Continue current medications, diet and exercise. More than 50% of the 25 minutes was spent in counseling patient and evaluating plan of treatment. Completed Refills   Requested Prescriptions     Signed Prescriptions Disp Refills    zolpidem (AMBIEN) 10 MG tablet 30 tablet 1     Sig: Take 0.5 tablets by mouth nightly as needed for Sleep .     metFORMIN (GLUCOPHAGE) 500 MG tablet 180 tablet 2     Sig: Take 1 tablet by mouth 2 times daily (with meals)    tadalafil (CIALIS) 20 MG tablet 30 tablet 2     Sig: Take 1 tablet by mouth as needed for Erectile Dysfunction     Electronically signed by Floyd Funk MD on 12/18/2017 at 7:34 PM

## 2018-01-04 RX ORDER — CEPHALEXIN 500 MG/1
500 CAPSULE ORAL 3 TIMES DAILY
Qty: 30 CAPSULE | Refills: 0 | Status: SHIPPED | OUTPATIENT
Start: 2018-01-04 | End: 2018-10-25 | Stop reason: ALTCHOICE

## 2018-01-04 NOTE — TELEPHONE ENCOUNTER
05/01/2018               Patient Active Problem List:     Sprain of flank     Irritant dermatitis     Chronic pain syndrome     DJD (degenerative joint disease), thoracic     Obesity     ED (erectile dysfunction)     Neuropathy (HCC)     DJD (degenerative joint disease), lumbosacral     Anemia     Sleep disorder     Sprain, knee     Internal derangement of knee joint     Trimalleolar fracture of right ankle     Right knee sprain     Olecranon bursitis of left elbow     Unstable gait     Special screening for malignant neoplasm of prostate     Type 2 diabetes mellitus without complication ([facility] [address]     Spondylosis of cervical region without myelopathy or radiculopathy     Essential hypertension     Mixed hyperlipidemia     Patient overweight     Incomplete tear of left rotator cuff     Obstructive rhinitis     Acute rhinosinusitis     Dehydration, mild     Acute left-sided low back pain with left-sided sciatica     Closed trimalleolar fracture of right ankle

## 2018-01-16 ENCOUNTER — OFFICE VISIT (OUTPATIENT)
Dept: FAMILY MEDICINE CLINIC | Age: 63
End: 2018-01-16
Payer: COMMERCIAL

## 2018-01-16 VITALS
HEART RATE: 75 BPM | DIASTOLIC BLOOD PRESSURE: 80 MMHG | TEMPERATURE: 98.2 F | BODY MASS INDEX: 28.93 KG/M2 | WEIGHT: 213.6 LBS | SYSTOLIC BLOOD PRESSURE: 179 MMHG | HEIGHT: 72 IN

## 2018-01-16 DIAGNOSIS — T73.2XXA FATIGUE DUE TO EXPOSURE, INITIAL ENCOUNTER: ICD-10-CM

## 2018-01-16 DIAGNOSIS — R22.0 FACIAL SWELLING: ICD-10-CM

## 2018-01-16 DIAGNOSIS — A32.9: Primary | ICD-10-CM

## 2018-01-16 PROCEDURE — 1036F TOBACCO NON-USER: CPT | Performed by: FAMILY MEDICINE

## 2018-01-16 PROCEDURE — G8484 FLU IMMUNIZE NO ADMIN: HCPCS | Performed by: FAMILY MEDICINE

## 2018-01-16 PROCEDURE — G8427 DOCREV CUR MEDS BY ELIG CLIN: HCPCS | Performed by: FAMILY MEDICINE

## 2018-01-16 PROCEDURE — 99214 OFFICE O/P EST MOD 30 MIN: CPT | Performed by: FAMILY MEDICINE

## 2018-01-16 PROCEDURE — 3017F COLORECTAL CA SCREEN DOC REV: CPT | Performed by: FAMILY MEDICINE

## 2018-01-16 PROCEDURE — G8417 CALC BMI ABV UP PARAM F/U: HCPCS | Performed by: FAMILY MEDICINE

## 2018-01-16 ASSESSMENT — ENCOUNTER SYMPTOMS
SORE THROAT: 0
NAUSEA: 0
TROUBLE SWALLOWING: 1
COUGH: 1
FACIAL SWELLING: 1
CONSTIPATION: 1
ABDOMINAL PAIN: 1
VOMITING: 0
BACK PAIN: 0
BLOOD IN STOOL: 0

## 2018-01-16 NOTE — PROGRESS NOTES
Chronic Disease Visit Information    BP Readings from Last 3 Encounters:   12/18/17 (!) 167/91   09/25/17 132/68   06/19/17 130/70          Hemoglobin A1C (%)   Date Value   10/21/2017 5.6   04/18/2017 5.7   07/16/2016 5.1     Microalb/Crt. Ratio (mcg/mg creat)   Date Value   10/21/2017 9     LDL Cholesterol (mg/dL)   Date Value   10/21/2017 118     HDL (mg/dL)   Date Value   10/21/2017 49     BUN (mg/dL)   Date Value   10/21/2017 11     CREATININE (mg/dL)   Date Value   10/21/2017 0.94     Glucose (mg/dL)   Date Value   10/21/2017 117 (H)   12/23/2011 107 (H)            Have you changed or started any medications since your last visit including any over-the-counter medicines, vitamins, or herbal medicines? no   Are you having any side effects from any of your medications? -  no  Have you stopped taking any of your medications? Is so, why? -  no    Have you seen any other physician or provider since your last visit? No  Have you had any other diagnostic tests since your last visit? No  Have you been seen in the emergency room and/or had an admission to a hospital since we last saw you? No  Have you had your annual diabetic retinal (eye) exam? No  Have you had your routine dental cleaning in the past 6 months? no    Have you activated your [employer] account? If not, what are your barriers?  Yes     Patient Care Team:  Jonathon Combs MD as PCP - General (Family Medicine)         Medical History Review  Past Medical, Family, and Social History reviewed and does contribute to the patient presenting condition    Health Maintenance   Topic Date Due    Diabetic retinal exam  07/15/2016    Diabetic foot exam  10/30/2016    DTaP/Tdap/Td vaccine (1 - Tdap) 04/18/2018 (Originally 7/20/1973)    Hepatitis C screen  04/18/2018 (Originally 1955)    HIV screen  04/18/2018 (Originally 7/20/1969)    A1C test (Diabetic or Prediabetic)  10/21/2018    Diabetic microalbuminuria test  10/21/2018    Lipid screen  10/21/2018

## 2018-01-16 NOTE — PROGRESS NOTES
Dysfunction 30 tablet 2    omeprazole (PRILOSEC) 20 MG delayed release capsule Take 1 capsule by mouth 2 times daily 60 capsule 3    meloxicam (MOBIC) 15 MG tablet Take 1 tablet by mouth daily 30 tablet 3    amoxicillin-clavulanate (AUGMENTIN) 875-125 MG per tablet TAKE ONE TABLET BY MOUTH TWICE A DAY 20 tablet 0    gabapentin (NEURONTIN) 600 MG tablet TAKE 1 TABLET BY MOUTH 3 TIMES A DAY 90 tablet 3    tiZANidine (ZANAFLEX) 4 MG tablet TAKE ONE TABLET BY MOUTH THREE TIMES A DAY AS NEEDED 90 tablet 1    ibuprofen (ADVIL;MOTRIN) 800 MG tablet Take 1 tablet by mouth every 8 hours as needed for Pain 90 tablet 3    lisinopril (PRINIVIL;ZESTRIL) 20 MG tablet TAKE 1 TABLET BY MOUTH EVERY  tablet 3    amLODIPine (NORVASC) 5 MG tablet TAKE ONE TABLET BY MOUTH DAILY 90 tablet 2    oxymetazoline (12 HOUR NASAL SPRAY) 0.05 % nasal spray 1 spray by Nasal route 2 times daily 15 mL 0    ketoconazole (NIZORAL) 2 % shampoo APPLY TOPICALLY DAILY AS NEEDED 240 mL 3    allopurinol (ZYLOPRIM) 100 MG tablet Take 1 tablet by mouth daily 30 tablet 3    oxyCODONE-acetaminophen (PERCOCET) 5-325 MG per tablet   0    DICLOFENAC PO Take 50 mg by mouth      simvastatin (ZOCOR) 40 MG tablet Take 1 tablet by mouth nightly. 90 tablet 3     No current facility-administered medications for this visit. No Known Allergies      Subjective:      Review of Systems   Constitutional: Positive for fatigue. Negative for chills, diaphoresis and fever. HENT: Positive for facial swelling and trouble swallowing. Negative for sore throat. Eyes: Negative for visual disturbance. Respiratory: Positive for cough (little). Gastrointestinal: Positive for abdominal pain (mild) and constipation. Negative for blood in stool, nausea and vomiting. Genitourinary: Negative for difficulty urinating, dysuria and hematuria. Musculoskeletal: Negative for back pain. Skin: Negative for rash. Neurological: Positive for headaches.  Negative for dizziness and light-headedness. Psychiatric/Behavioral: Negative for sleep disturbance. Objective:     Physical Exam   Constitutional: He is oriented to person, place, and time. He appears well-developed and well-nourished. HENT:   Head: Normocephalic. Right Ear: External ear normal.   Left Ear: External ear normal.   Nose: Mucosal edema present. Nose  is congested and wet. Narrow air passages. Erythema plus. Tongue appears dry. No facial swelling noted. No nystagmus. Eyes: Pupils are equal, round, and reactive to light. No scleral icterus. Neck: Normal range of motion. Neck supple. No JVD present. Cardiovascular: Normal rate, regular rhythm and normal heart sounds. Exam reveals no gallop and no friction rub. No murmur heard. Pulmonary/Chest: Effort normal and breath sounds normal. He has no wheezes. He has no rales. No chest wall tenderness noted. Abdominal: Soft. Bowel sounds are normal.   Mild epigastric tenderness noted. No guarding or rebound. No masses. Liver & spleen are not palpable. Musculoskeletal: Normal range of motion. He exhibits no edema or tenderness. Lumbar back: Normal.   He moves all his limbs well. He is able to lie down and sit up. His gait appears stable. No tremors noted. Lymphadenopathy:     He has no cervical adenopathy. Neurological: He is alert and oriented to person, place, and time. He has normal reflexes. Coordination and gait normal.   Skin: Skin is warm and dry. No rash noted. Psychiatric: He has a normal mood and affect. His behavior is normal. Judgment and thought content normal.   Nursing note and vitals reviewed. BP (!) 179/80 (Site: Left Arm, Position: Sitting, Cuff Size: Large Adult)   Pulse 75   Temp 98.2 °F (36.8 °C) (Temporal)   Ht 6' 0.01\" (1.829 m)   Wt 213 lb 9.6 oz (96.9 kg)   BMI 28.96 kg/m²     Assessment:      1. Infection, listeria  Culture Blood #1   2. Facial swelling     3.  Fatigue due to exposure, initial encounter         Plan:      No Follow-up on file. Orders Placed This Encounter   Procedures    Culture Blood #1     Ingested food contaminated with Listeria. Please culture for Listeria. Standing Status:   Future     Standing Expiration Date:   1/17/2019     No orders of the defined types were placed in this encounter. Findings and/or pathophysiology discussed with patient. Plan of treatment discussed. Patient's medical problems were discussed with him. Findings were explained. Chart was evaluated. Available lab work and tests results were discussed. His medications were reviewed. Patient has also completed a course of Keflex. Patient appears stable at present. I've advised patient that if he still does not feel well he may need a blood culture for Listeria. Prescription was given. Patient is advised to take a bland diet and drink fluids. He will return if he has any problems. Otherwise he will go to the ER. He will also await any reports from the grocery store. 1.  Pool received counseling on the following healthy behaviors: nutrition  2. Patient given educational materials - see patient instructions  3. Was a self-tracking handout given in paper form or via [employer]? No  If yes, see orders or list here. 4.  Discussed use, benefit, and side effects of prescribed medications. Barriers to medication compliance addressed. All patient questions answered. Pt voiced understanding. 5.  Reviewed prior labs and health maintenance  6. Continue current medications, diet and exercise.     Completed Refills   Requested Prescriptions      No prescriptions requested or ordered in this encounter     Electronically signed by Willie Leonard MD on 1/17/2018 at 6:41 PM

## 2018-01-17 PROBLEM — T73.2XXA FATIGUE DUE TO EXPOSURE: Status: ACTIVE | Noted: 2018-01-17

## 2018-01-17 PROBLEM — A32.9: Status: ACTIVE | Noted: 2018-01-17

## 2018-01-17 PROBLEM — R22.0 FACIAL SWELLING: Status: ACTIVE | Noted: 2018-01-17

## 2018-02-19 ENCOUNTER — TELEPHONE (OUTPATIENT)
Dept: FAMILY MEDICINE CLINIC | Age: 63
End: 2018-02-19

## 2018-02-28 ENCOUNTER — HOSPITAL ENCOUNTER (OUTPATIENT)
Age: 63
Discharge: HOME | End: 2018-02-28
Payer: COMMERCIAL

## 2018-02-28 DIAGNOSIS — A32.9: ICD-10-CM

## 2018-02-28 PROCEDURE — 87040 BLOOD CULTURE FOR BACTERIA: CPT

## 2018-02-28 PROCEDURE — 36415 COLL VENOUS BLD VENIPUNCTURE: CPT

## 2018-03-06 LAB
CULTURE: NORMAL
CULTURE: NORMAL
Lab: NORMAL
SPECIMEN DESCRIPTION: NORMAL
SPECIMEN DESCRIPTION: NORMAL
STATUS: NORMAL

## 2018-03-06 RX ORDER — OMEPRAZOLE 20 MG/1
CAPSULE, DELAYED RELEASE ORAL
Qty: 60 CAPSULE | Refills: 2 | Status: SHIPPED | OUTPATIENT
Start: 2018-03-06 | End: 2018-04-23 | Stop reason: SDUPTHER

## 2018-03-06 NOTE — TELEPHONE ENCOUNTER
05/01/2018   Lipid Panel Once 05/01/2018   Hemoglobin A1C Once 05/01/2018   Psa screening Once 05/01/2018               Patient Active Problem List:     Sprain of flank     Irritant dermatitis     Chronic pain syndrome     DJD (degenerative joint disease), thoracic     Obesity     ED (erectile dysfunction)     Neuropathy (HCC)     DJD (degenerative joint disease), lumbosacral     Anemia     Sleep disorder     Sprain, knee     Internal derangement of knee joint     Trimalleolar fracture of right ankle     Right knee sprain     Olecranon bursitis of left elbow     Unstable gait     Special screening for malignant neoplasm of prostate     Type 2 diabetes mellitus without complication (HCC)     Spondylosis of cervical region without myelopathy or radiculopathy     Essential hypertension     Mixed hyperlipidemia     Patient overweight     Incomplete tear of left rotator cuff     Obstructive rhinitis     Acute rhinosinusitis     Dehydration, mild     Acute left-sided low back pain with left-sided sciatica     Closed trimalleolar fracture of right ankle     Infection, listeria     Facial swelling     Fatigue due to exposure

## 2018-04-23 ENCOUNTER — OFFICE VISIT (OUTPATIENT)
Dept: FAMILY MEDICINE CLINIC | Age: 63
End: 2018-04-23
Payer: COMMERCIAL

## 2018-04-23 VITALS
BODY MASS INDEX: 29.72 KG/M2 | SYSTOLIC BLOOD PRESSURE: 184 MMHG | HEART RATE: 69 BPM | DIASTOLIC BLOOD PRESSURE: 85 MMHG | WEIGHT: 219.4 LBS | HEIGHT: 72 IN

## 2018-04-23 DIAGNOSIS — E11.9 TYPE 2 DIABETES MELLITUS WITHOUT COMPLICATION, WITHOUT LONG-TERM CURRENT USE OF INSULIN (HCC): ICD-10-CM

## 2018-04-23 DIAGNOSIS — R09.81 NASAL SINUS CONGESTION: Primary | ICD-10-CM

## 2018-04-23 DIAGNOSIS — M47.817 DJD (DEGENERATIVE JOINT DISEASE), LUMBOSACRAL: ICD-10-CM

## 2018-04-23 DIAGNOSIS — E78.2 MIXED HYPERLIPIDEMIA: ICD-10-CM

## 2018-04-23 DIAGNOSIS — I10 ESSENTIAL HYPERTENSION: ICD-10-CM

## 2018-04-23 DIAGNOSIS — K21.9 GASTROESOPHAGEAL REFLUX DISEASE, ESOPHAGITIS PRESENCE NOT SPECIFIED: ICD-10-CM

## 2018-04-23 PROBLEM — S82.899A CLOSED FRACTURE OF ANKLE: Status: ACTIVE | Noted: 2018-04-23

## 2018-04-23 PROBLEM — S82.899A FRACTURE OF ANKLE: Status: ACTIVE | Noted: 2018-04-23

## 2018-04-23 PROBLEM — M77.10 LATERAL EPICONDYLITIS: Status: ACTIVE | Noted: 2018-04-23

## 2018-04-23 PROCEDURE — 99214 OFFICE O/P EST MOD 30 MIN: CPT | Performed by: PHYSICIAN ASSISTANT

## 2018-04-23 PROCEDURE — G8417 CALC BMI ABV UP PARAM F/U: HCPCS | Performed by: PHYSICIAN ASSISTANT

## 2018-04-23 PROCEDURE — 1036F TOBACCO NON-USER: CPT | Performed by: PHYSICIAN ASSISTANT

## 2018-04-23 PROCEDURE — G8427 DOCREV CUR MEDS BY ELIG CLIN: HCPCS | Performed by: PHYSICIAN ASSISTANT

## 2018-04-23 PROCEDURE — 3046F HEMOGLOBIN A1C LEVEL >9.0%: CPT | Performed by: PHYSICIAN ASSISTANT

## 2018-04-23 PROCEDURE — 2022F DILAT RTA XM EVC RTNOPTHY: CPT | Performed by: PHYSICIAN ASSISTANT

## 2018-04-23 PROCEDURE — 3017F COLORECTAL CA SCREEN DOC REV: CPT | Performed by: PHYSICIAN ASSISTANT

## 2018-04-23 RX ORDER — AMOXICILLIN AND CLAVULANATE POTASSIUM 875; 125 MG/1; MG/1
TABLET, FILM COATED ORAL
Qty: 14 TABLET | Refills: 0 | Status: SHIPPED | OUTPATIENT
Start: 2018-04-23 | End: 2018-05-02 | Stop reason: SDUPTHER

## 2018-04-23 RX ORDER — SIMVASTATIN 40 MG
TABLET ORAL
COMMUNITY
End: 2019-01-07 | Stop reason: SDUPTHER

## 2018-04-23 RX ORDER — PROMETHAZINE HYDROCHLORIDE 25 MG/1
TABLET ORAL
COMMUNITY
End: 2019-03-13

## 2018-04-23 RX ORDER — ZOLPIDEM TARTRATE 10 MG/1
TABLET ORAL
COMMUNITY
End: 2018-07-24 | Stop reason: SDUPTHER

## 2018-04-23 RX ORDER — KETOCONAZOLE 20 MG/ML
SHAMPOO TOPICAL
COMMUNITY

## 2018-04-23 RX ORDER — GABAPENTIN 300 MG/1
CAPSULE ORAL
COMMUNITY

## 2018-04-23 RX ORDER — TIZANIDINE 4 MG/1
TABLET ORAL
Qty: 30 TABLET | Refills: 0 | Status: SHIPPED | OUTPATIENT
Start: 2018-04-23 | End: 2018-10-25 | Stop reason: SDUPTHER

## 2018-04-23 RX ORDER — TIZANIDINE 4 MG/1
TABLET ORAL
COMMUNITY
End: 2018-04-23 | Stop reason: SDUPTHER

## 2018-04-23 RX ORDER — GUAIFENESIN 600 MG/1
600 TABLET, EXTENDED RELEASE ORAL 2 TIMES DAILY PRN
Qty: 14 TABLET | Refills: 0 | Status: SHIPPED | OUTPATIENT
Start: 2018-04-23 | End: 2018-05-02 | Stop reason: SDUPTHER

## 2018-04-23 RX ORDER — LISINOPRIL 20 MG/1
TABLET ORAL
COMMUNITY
End: 2018-04-23 | Stop reason: SDUPTHER

## 2018-04-23 RX ORDER — LISINOPRIL 20 MG/1
TABLET ORAL
Qty: 30 TABLET | Refills: 3 | Status: SHIPPED | OUTPATIENT
Start: 2018-04-23 | End: 2018-10-25 | Stop reason: SDUPTHER

## 2018-04-23 RX ORDER — AMLODIPINE BESYLATE 5 MG/1
TABLET ORAL
Qty: 30 TABLET | Refills: 2 | Status: SHIPPED | OUTPATIENT
Start: 2018-04-23 | End: 2018-07-24 | Stop reason: SDUPTHER

## 2018-04-23 RX ORDER — OXYCODONE HYDROCHLORIDE AND ACETAMINOPHEN 5; 325 MG/1; MG/1
TABLET ORAL
COMMUNITY

## 2018-04-23 RX ORDER — ONDANSETRON 4 MG/1
TABLET, FILM COATED ORAL
COMMUNITY
End: 2019-03-13

## 2018-04-23 RX ORDER — OMEPRAZOLE 20 MG/1
CAPSULE, DELAYED RELEASE ORAL
COMMUNITY

## 2018-04-23 RX ORDER — AMLODIPINE BESYLATE 5 MG/1
TABLET ORAL
COMMUNITY
End: 2018-04-23 | Stop reason: SDUPTHER

## 2018-04-23 RX ORDER — SILDENAFIL 100 MG/1
TABLET, FILM COATED ORAL
COMMUNITY
End: 2018-10-25

## 2018-04-23 ASSESSMENT — ENCOUNTER SYMPTOMS
COUGH: 0
SPUTUM PRODUCTION: 0
VOMITING: 0
SINUS PRESSURE: 1
BACK PAIN: 1
NAUSEA: 0
SINUS PAIN: 1
HOARSE VOICE: 0
SORE THROAT: 0
SHORTNESS OF BREATH: 0

## 2018-04-24 PROBLEM — M54.42 ACUTE LEFT-SIDED LOW BACK PAIN WITH LEFT-SIDED SCIATICA: Status: RESOLVED | Noted: 2017-04-18 | Resolved: 2018-04-24

## 2018-04-24 PROBLEM — S82.899A FRACTURE OF ANKLE: Status: RESOLVED | Noted: 2018-04-23 | Resolved: 2018-04-24

## 2018-04-24 PROBLEM — J01.90 ACUTE RHINOSINUSITIS: Status: RESOLVED | Noted: 2017-02-08 | Resolved: 2018-04-24

## 2018-04-24 PROBLEM — E86.0 DEHYDRATION, MILD: Status: RESOLVED | Noted: 2017-02-08 | Resolved: 2018-04-24

## 2018-04-24 PROBLEM — K21.9 GASTROESOPHAGEAL REFLUX DISEASE: Status: ACTIVE | Noted: 2018-04-24

## 2018-04-24 PROBLEM — S82.899A CLOSED FRACTURE OF ANKLE: Status: RESOLVED | Noted: 2018-04-23 | Resolved: 2018-04-24

## 2018-04-24 PROBLEM — A32.9: Status: RESOLVED | Noted: 2018-01-17 | Resolved: 2018-04-24

## 2018-05-02 ENCOUNTER — TELEPHONE (OUTPATIENT)
Dept: FAMILY MEDICINE CLINIC | Age: 63
End: 2018-05-02

## 2018-05-02 DIAGNOSIS — R09.81 NASAL SINUS CONGESTION: ICD-10-CM

## 2018-05-02 RX ORDER — GUAIFENESIN 600 MG/1
600 TABLET, EXTENDED RELEASE ORAL 2 TIMES DAILY PRN
Qty: 14 TABLET | Refills: 0 | Status: SHIPPED | OUTPATIENT
Start: 2018-05-02 | End: 2018-05-09

## 2018-05-02 RX ORDER — AMOXICILLIN AND CLAVULANATE POTASSIUM 875; 125 MG/1; MG/1
TABLET, FILM COATED ORAL
Qty: 14 TABLET | Refills: 0 | Status: SHIPPED | OUTPATIENT
Start: 2018-05-02 | End: 2018-05-09

## 2018-05-02 NOTE — TELEPHONE ENCOUNTER
Patient called and said that his face is still swollen and needs another prescription of the antibiotic sent to [facility] on [place].

## 2018-07-24 ENCOUNTER — OFFICE VISIT (OUTPATIENT)
Dept: FAMILY MEDICINE CLINIC | Age: 63
End: 2018-07-24
Payer: COMMERCIAL

## 2018-07-24 VITALS
HEIGHT: 72 IN | TEMPERATURE: 97.7 F | OXYGEN SATURATION: 95 % | BODY MASS INDEX: 29.8 KG/M2 | DIASTOLIC BLOOD PRESSURE: 77 MMHG | WEIGHT: 220 LBS | SYSTOLIC BLOOD PRESSURE: 151 MMHG | HEART RATE: 90 BPM | RESPIRATION RATE: 20 BRPM

## 2018-07-24 DIAGNOSIS — Z12.5 SPECIAL SCREENING FOR MALIGNANT NEOPLASM OF PROSTATE: ICD-10-CM

## 2018-07-24 DIAGNOSIS — G89.29 CHRONIC PAIN OF RIGHT ANKLE: ICD-10-CM

## 2018-07-24 DIAGNOSIS — E11.9 TYPE 2 DIABETES MELLITUS WITHOUT COMPLICATION, WITHOUT LONG-TERM CURRENT USE OF INSULIN (HCC): ICD-10-CM

## 2018-07-24 DIAGNOSIS — G47.9 SLEEP DISORDER: ICD-10-CM

## 2018-07-24 DIAGNOSIS — M25.571 CHRONIC PAIN OF RIGHT ANKLE: ICD-10-CM

## 2018-07-24 DIAGNOSIS — Z72.89 OTHER PROBLEMS RELATED TO LIFESTYLE: ICD-10-CM

## 2018-07-24 DIAGNOSIS — E78.2 MIXED HYPERLIPIDEMIA: ICD-10-CM

## 2018-07-24 DIAGNOSIS — K21.9 GASTROESOPHAGEAL REFLUX DISEASE, ESOPHAGITIS PRESENCE NOT SPECIFIED: ICD-10-CM

## 2018-07-24 DIAGNOSIS — I10 ESSENTIAL HYPERTENSION: Primary | ICD-10-CM

## 2018-07-24 PROCEDURE — 99214 OFFICE O/P EST MOD 30 MIN: CPT | Performed by: PHYSICIAN ASSISTANT

## 2018-07-24 PROCEDURE — 3046F HEMOGLOBIN A1C LEVEL >9.0%: CPT | Performed by: PHYSICIAN ASSISTANT

## 2018-07-24 PROCEDURE — G8417 CALC BMI ABV UP PARAM F/U: HCPCS | Performed by: PHYSICIAN ASSISTANT

## 2018-07-24 PROCEDURE — 3017F COLORECTAL CA SCREEN DOC REV: CPT | Performed by: PHYSICIAN ASSISTANT

## 2018-07-24 PROCEDURE — 1036F TOBACCO NON-USER: CPT | Performed by: PHYSICIAN ASSISTANT

## 2018-07-24 PROCEDURE — G8427 DOCREV CUR MEDS BY ELIG CLIN: HCPCS | Performed by: PHYSICIAN ASSISTANT

## 2018-07-24 PROCEDURE — 2022F DILAT RTA XM EVC RTNOPTHY: CPT | Performed by: PHYSICIAN ASSISTANT

## 2018-07-24 RX ORDER — AMLODIPINE BESYLATE 10 MG/1
TABLET ORAL
Qty: 30 TABLET | Refills: 2 | Status: SHIPPED | OUTPATIENT
Start: 2018-07-24 | End: 2018-10-25 | Stop reason: SDUPTHER

## 2018-07-24 RX ORDER — ZOLPIDEM TARTRATE 10 MG/1
TABLET ORAL
Qty: 30 TABLET | Refills: 1 | Status: SHIPPED | OUTPATIENT
Start: 2018-07-24 | End: 2018-09-22

## 2018-07-24 ASSESSMENT — PATIENT HEALTH QUESTIONNAIRE - PHQ9
SUM OF ALL RESPONSES TO PHQ9 QUESTIONS 1 & 2: 0
1. LITTLE INTEREST OR PLEASURE IN DOING THINGS: 0
2. FEELING DOWN, DEPRESSED OR HOPELESS: 0
SUM OF ALL RESPONSES TO PHQ QUESTIONS 1-9: 0

## 2018-07-24 ASSESSMENT — ENCOUNTER SYMPTOMS: BACK PAIN: 1

## 2018-07-24 NOTE — PROGRESS NOTES
[address]  [place]  [address] [address]  Dept: [phone]  Dept Fax: [phone]    Office Progress/Follow Up Note  Date of patient's visit: 7/24/2018  Patient's Name:  Barber Polo YOB: 1955            Patient Care Team:  Luzmaria Palomo MD as PCP - General (Family Medicine)  ================================================================    REASON FOR VISIT/CHIEF COMPLAINT:  Hypertension    HISTORY OF PRESENTING ILLNESS:  History was obtained from: patient. Barber Polo is a 58 y.o. is here for follow-up for high blood pressure, diabetes, HLD, GERD. Patient states he is compliant with all medications. Patient blood pressure is elevated in office. Discussed uncontrolledBP, increased risk of heart attack or stroke in depth with patient, patient his blood pressure has been elevated last few visits, informed patient blood pressure medication will be increased, encouraged patient to monitor blood pressure at home. pt states he is being followed by Ortho for right ankle pain, he does receive injections and pain is improved, instructed patient to continue to follow up with Ortho. She is requesting a refill for his sleeping medication, refill given. Patient weight is stable. Lab work reviewed, informed patient he is due for lab work to evaluate diabetes, HLD, started patient have lab work completed before follow-up appointment, patient voices understanding. Health maintenance reviewed, discussed prostate cancer screening and death with patient, informed patient he will be sent for lab work to screen, discussed hepatitis C and HIV screening in depth with patient, informed patient he will be sent for lab work to screen for hep C and HIV. OARRS reviewed. Medications reviewed, prescribed amlodipine 10 mg daily, refilled zolpidem 10 mg.   Informed patient there'll be no changes to medication for diabetes, HLD, GERD, continue current treatments. HPI    Patient Active Problem List   Diagnosis    Chronic pain syndrome    DJD (degenerative joint disease), thoracic    Obesity    ED (erectile dysfunction)    Neuropathy (HCC)    DJD (degenerative joint disease), lumbosacral    Anemia    Sleep disorder    Internal derangement of knee joint    Unstable gait    Special screening for malignant neoplasm of prostate    Type 2 diabetes mellitus without complication ([facility] [address]    Spondylosis of cervical region without myelopathy or radiculopathy    Essential hypertension    Mixed hyperlipidemia    Incomplete tear of left rotator cuff    Obstructive rhinitis    Closed trimalleolar fracture of right ankle    Fatigue due to exposure    Gastroesophageal reflux disease       Health Maintenance Due   Topic Date Due    Hepatitis C screen  1955    HIV screen  07/20/1969    Diabetic foot exam  10/30/2016       No Known Allergies      Current Outpatient Prescriptions   Medication Sig Dispense Refill    zolpidem (AMBIEN) 10 MG tablet zolpidem 10 mg tablet.  30 tablet 1    amLODIPine (NORVASC) 10 MG tablet Take 1 pill daily 30 tablet 2    oxyCODONE-acetaminophen (PERCOCET) 5-325 MG per tablet oxycodone-acetaminophen 5 mg-325 mg tablet      gabapentin (NEURONTIN) 300 MG capsule gabapentin 300 mg capsule      ondansetron (ZOFRAN) 4 MG tablet ondansetron HCl 4 mg tablet      promethazine (PHENERGAN) 25 MG tablet promethazine 25 mg tablet      simvastatin (ZOCOR) 40 MG tablet simvastatin 40 mg tablet      sildenafil (VIAGRA) 100 MG tablet Viagra 100 mg tablet      fluocinonide (LIDEX) 0.05 % cream fluocinonide 0.05 % topical cream      ketoconazole (NIZORAL) 2 % shampoo ketoconazole 2 % shampoo      omeprazole (PRILOSEC) 20 MG delayed release capsule omeprazole 20 mg capsule,delayed release      tiZANidine (ZANAFLEX) 4 MG tablet tizanidine 4 mg tablet 30 tablet 0    metFORMIN (GLUCOPHAGE) 500 MG tablet metformin 500 mg tablet 60 tablet 3    lisinopril (PRINIVIL;ZESTRIL) 20 MG tablet lisinopril 20 mg tablet 30 tablet 3    cephALEXin (KEFLEX) 500 MG capsule Take 1 capsule by mouth 3 times daily 30 capsule 0    tadalafil (CIALIS) 20 MG tablet Take 1 tablet by mouth as needed for Erectile Dysfunction 30 tablet 2    meloxicam (MOBIC) 15 MG tablet Take 1 tablet by mouth daily 30 tablet 3    ibuprofen (ADVIL;MOTRIN) 800 MG tablet Take 1 tablet by mouth every 8 hours as needed for Pain 90 tablet 3    DICLOFENAC PO Take 50 mg by mouth       No current facility-administered medications for this visit. Social History   Substance Use Topics    Smoking status: Former Smoker     Packs/day: 1.00     Years: 10.00     Quit date: 1/1/1991    Smokeless tobacco: Never Used    Alcohol use No       Family History   Problem Relation Age of Onset    Diabetes Mother     High Blood Pressure Mother     Heart Failure Mother     Heart Attack Father         REVIEW OF SYSTEMS:  Review of Systems   Constitutional: Negative for chills and fever. Respiratory: Negative for cough and shortness of breath. Cardiovascular: Negative for chest pain. Gastrointestinal: Negative for constipation, diarrhea, nausea and vomiting. Genitourinary: Negative for dysuria, frequency and urgency. Musculoskeletal: Positive for back pain and joint pain. Negative for myalgias and neck pain. Neurological: Negative for headaches.        PHYSICAL EXAM:  Vitals:    07/24/18 1642 07/24/18 1720   BP: (!) 159/85 (!) 151/77   Site: Left Arm Left Arm   Position: Sitting Sitting   Cuff Size: Medium Adult Large Adult   Pulse: 90    Resp: 20    Temp: 97.7 °F (36.5 °C)    TempSrc: Oral    SpO2: 95%    Weight: 220 lb (99.8 kg)    Height: 6' (1.829 m)      BP Readings from Last 3 Encounters:   07/24/18 (!) 151/77   04/23/18 (!) 184/85   01/16/18 (!) 179/80        Physical Exam   Constitutional: He is oriented to person, place, and time and well-developed, well-nourished, and in no

## 2018-07-25 PROBLEM — R22.0 FACIAL SWELLING: Status: RESOLVED | Noted: 2018-01-17 | Resolved: 2018-07-25

## 2018-07-25 PROBLEM — M77.10 LATERAL EPICONDYLITIS: Status: RESOLVED | Noted: 2018-04-23 | Resolved: 2018-07-25

## 2018-07-25 ASSESSMENT — ENCOUNTER SYMPTOMS
COUGH: 0
CONSTIPATION: 0
NAUSEA: 0
SHORTNESS OF BREATH: 0
DIARRHEA: 0
VOMITING: 0

## 2018-10-25 ENCOUNTER — OFFICE VISIT (OUTPATIENT)
Dept: FAMILY MEDICINE CLINIC | Age: 63
End: 2018-10-25
Payer: COMMERCIAL

## 2018-10-25 VITALS
RESPIRATION RATE: 20 BRPM | TEMPERATURE: 98.2 F | SYSTOLIC BLOOD PRESSURE: 188 MMHG | WEIGHT: 220 LBS | OXYGEN SATURATION: 99 % | DIASTOLIC BLOOD PRESSURE: 96 MMHG | HEIGHT: 72 IN | BODY MASS INDEX: 29.8 KG/M2 | HEART RATE: 72 BPM

## 2018-10-25 DIAGNOSIS — Z00.00 ANNUAL PHYSICAL EXAM: ICD-10-CM

## 2018-10-25 DIAGNOSIS — E66.3 OVERWEIGHT (BMI 25.0-29.9): ICD-10-CM

## 2018-10-25 DIAGNOSIS — M25.571 CHRONIC PAIN OF RIGHT ANKLE: ICD-10-CM

## 2018-10-25 DIAGNOSIS — M47.817 DJD (DEGENERATIVE JOINT DISEASE), LUMBOSACRAL: ICD-10-CM

## 2018-10-25 DIAGNOSIS — I10 ESSENTIAL HYPERTENSION: Primary | ICD-10-CM

## 2018-10-25 DIAGNOSIS — G47.9 SLEEP DISORDER: ICD-10-CM

## 2018-10-25 DIAGNOSIS — E11.9 TYPE 2 DIABETES MELLITUS WITHOUT COMPLICATION, WITHOUT LONG-TERM CURRENT USE OF INSULIN (HCC): ICD-10-CM

## 2018-10-25 DIAGNOSIS — G89.29 CHRONIC PAIN OF RIGHT ANKLE: ICD-10-CM

## 2018-10-25 DIAGNOSIS — K21.9 GASTROESOPHAGEAL REFLUX DISEASE, ESOPHAGITIS PRESENCE NOT SPECIFIED: ICD-10-CM

## 2018-10-25 PROCEDURE — 99396 PREV VISIT EST AGE 40-64: CPT | Performed by: PHYSICIAN ASSISTANT

## 2018-10-25 PROCEDURE — G8484 FLU IMMUNIZE NO ADMIN: HCPCS | Performed by: PHYSICIAN ASSISTANT

## 2018-10-25 RX ORDER — ZOLPIDEM TARTRATE 12.5 MG/1
12.5 TABLET, FILM COATED, EXTENDED RELEASE ORAL NIGHTLY PRN
Qty: 30 TABLET | Refills: 3 | Status: SHIPPED | OUTPATIENT
Start: 2018-10-25 | End: 2019-01-23

## 2018-10-25 RX ORDER — AMLODIPINE BESYLATE 10 MG/1
TABLET ORAL
Qty: 30 TABLET | Refills: 3 | Status: SHIPPED | OUTPATIENT
Start: 2018-10-25 | End: 2019-04-15 | Stop reason: SDUPTHER

## 2018-10-25 RX ORDER — LISINOPRIL 20 MG/1
TABLET ORAL
Qty: 30 TABLET | Refills: 3 | Status: SHIPPED | OUTPATIENT
Start: 2018-10-25 | End: 2019-03-15 | Stop reason: SDUPTHER

## 2018-10-25 RX ORDER — TIZANIDINE 4 MG/1
TABLET ORAL
Qty: 30 TABLET | Refills: 0 | Status: SHIPPED | OUTPATIENT
Start: 2018-10-25 | End: 2020-10-07 | Stop reason: SDUPTHER

## 2018-10-25 ASSESSMENT — ENCOUNTER SYMPTOMS: BACK PAIN: 1

## 2018-10-25 NOTE — PROGRESS NOTES
Visit Information    Have you changed or started any medications since your last visit including any over-the-counter medicines, vitamins, or herbal medicines? no   Have you stopped taking any of your medications? Is so, why? -  no  Are you having any side effects from any of your medications? - no    Have you seen any other physician or provider since your last visit?  no   Have you had any other diagnostic tests since your last visit?  no   Have you been seen in the emergency room and/or had an admission in a hospital since we last saw you?  no   Have you had your routine dental cleaning in the past 6 months?  no     Do you have an active MyChart account? If no, what is the barrier?   Yes    Patient Care Team:  Mya Lopes PA-C as PCP - General    Medical History Review  Past Medical, Family, and Social History reviewed and does not contribute to the patient presenting condition    Health Maintenance   Topic Date Due    Hepatitis C screen  1955    HIV screen  07/20/1969    Diabetic foot exam  10/30/2016    Flu vaccine (1) 09/01/2018    A1C test (Diabetic or Prediabetic)  10/21/2018    Diabetic microalbuminuria test  10/21/2018    Potassium monitoring  10/21/2018    Creatinine monitoring  10/21/2018    Lipid screen  10/21/2018    DTaP/Tdap/Td vaccine (1 - Tdap) 04/23/2019 (Originally 7/20/1973)    Shingles Vaccine (1 of 2 - 2 Dose Series) 04/23/2019 (Originally 7/20/2004)    Colon cancer screen colonoscopy  01/01/2019    Diabetic retinal exam  03/30/2019    Pneumococcal med risk  Completed

## 2018-10-26 ENCOUNTER — TELEPHONE (OUTPATIENT)
Dept: FAMILY MEDICINE CLINIC | Age: 63
End: 2018-10-26

## 2018-10-27 ENCOUNTER — HOSPITAL ENCOUNTER (OUTPATIENT)
Age: 63
Discharge: HOME | End: 2018-10-27
Payer: COMMERCIAL

## 2018-10-27 DIAGNOSIS — E78.2 MIXED HYPERLIPIDEMIA: ICD-10-CM

## 2018-10-27 DIAGNOSIS — I10 ESSENTIAL HYPERTENSION: ICD-10-CM

## 2018-10-27 DIAGNOSIS — Z12.5 SPECIAL SCREENING FOR MALIGNANT NEOPLASM OF PROSTATE: ICD-10-CM

## 2018-10-27 DIAGNOSIS — Z72.89 OTHER PROBLEMS RELATED TO LIFESTYLE: ICD-10-CM

## 2018-10-27 DIAGNOSIS — E11.9 TYPE 2 DIABETES MELLITUS WITHOUT COMPLICATION, WITHOUT LONG-TERM CURRENT USE OF INSULIN (HCC): ICD-10-CM

## 2018-10-27 LAB
ABSOLUTE EOS #: 0.12 K/UL (ref 0–0.44)
ABSOLUTE IMMATURE GRANULOCYTE: 0.03 K/UL (ref 0–0.3)
ABSOLUTE LYMPH #: 1.69 K/UL (ref 1.1–3.7)
ABSOLUTE MONO #: 0.58 K/UL (ref 0.1–1.2)
ALBUMIN SERPL-MCNC: 4.5 G/DL (ref 3.5–5.2)
ALBUMIN/GLOBULIN RATIO: 1.6 (ref 1–2.5)
ALP BLD-CCNC: 80 U/L (ref 40–129)
ALT SERPL-CCNC: 24 U/L (ref 5–41)
ANION GAP SERPL CALCULATED.3IONS-SCNC: 17 MMOL/L (ref 9–17)
AST SERPL-CCNC: 22 U/L
BASOPHILS # BLD: 1 % (ref 0–2)
BASOPHILS ABSOLUTE: 0.05 K/UL (ref 0–0.2)
BILIRUB SERPL-MCNC: 0.84 MG/DL (ref 0.3–1.2)
BUN BLDV-MCNC: 15 MG/DL (ref 8–23)
BUN/CREAT BLD: ABNORMAL (ref 9–20)
CALCIUM SERPL-MCNC: 9.5 MG/DL (ref 8.6–10.4)
CHLORIDE BLD-SCNC: 103 MMOL/L (ref 98–107)
CHOLESTEROL/HDL RATIO: 4.7
CHOLESTEROL: 228 MG/DL
CO2: 25 MMOL/L (ref 20–31)
CREAT SERPL-MCNC: 1 MG/DL (ref 0.7–1.2)
CREATININE URINE: 111.4 MG/DL (ref 39–259)
DIFFERENTIAL TYPE: ABNORMAL
EOSINOPHILS RELATIVE PERCENT: 1 % (ref 1–4)
GFR AFRICAN AMERICAN: >60 ML/MIN
GFR NON-AFRICAN AMERICAN: >60 ML/MIN
GFR SERPL CREATININE-BSD FRML MDRD: ABNORMAL ML/MIN/{1.73_M2}
GFR SERPL CREATININE-BSD FRML MDRD: ABNORMAL ML/MIN/{1.73_M2}
GLUCOSE BLD-MCNC: 121 MG/DL (ref 70–99)
HCT VFR BLD CALC: 45.7 % (ref 40.7–50.3)
HDLC SERPL-MCNC: 49 MG/DL
HEMOGLOBIN: 14.5 G/DL (ref 13–17)
HEPATITIS C ANTIBODY: NONREACTIVE
HIV AG/AB: NONREACTIVE
IMMATURE GRANULOCYTES: 0 %
LDL CHOLESTEROL: 158 MG/DL (ref 0–130)
LYMPHOCYTES # BLD: 19 % (ref 24–43)
MCH RBC QN AUTO: 27 PG (ref 25.2–33.5)
MCHC RBC AUTO-ENTMCNC: 31.7 G/DL (ref 28.4–34.8)
MCV RBC AUTO: 85.1 FL (ref 82.6–102.9)
MICROALBUMIN/CREAT 24H UR: <12 MG/L
MICROALBUMIN/CREAT UR-RTO: NORMAL MCG/MG CREAT
MONOCYTES # BLD: 7 % (ref 3–12)
NRBC AUTOMATED: 0 PER 100 WBC
PDW BLD-RTO: 12.5 % (ref 11.8–14.4)
PLATELET # BLD: 299 K/UL (ref 138–453)
PLATELET ESTIMATE: ABNORMAL
PMV BLD AUTO: 10.1 FL (ref 8.1–13.5)
POTASSIUM SERPL-SCNC: 4 MMOL/L (ref 3.7–5.3)
PROSTATE SPECIFIC ANTIGEN: 2.9 UG/L
RBC # BLD: 5.37 M/UL (ref 4.21–5.77)
RBC # BLD: ABNORMAL 10*6/UL
SEG NEUTROPHILS: 72 % (ref 36–65)
SEGMENTED NEUTROPHILS ABSOLUTE COUNT: 6.26 K/UL (ref 1.5–8.1)
SODIUM BLD-SCNC: 145 MMOL/L (ref 135–144)
TOTAL PROTEIN: 7.4 G/DL (ref 6.4–8.3)
TRIGL SERPL-MCNC: 107 MG/DL
VLDLC SERPL CALC-MCNC: ABNORMAL MG/DL (ref 1–30)
WBC # BLD: 8.7 K/UL (ref 3.5–11.3)
WBC # BLD: ABNORMAL 10*3/UL

## 2018-10-27 PROCEDURE — 36415 COLL VENOUS BLD VENIPUNCTURE: CPT

## 2018-10-27 PROCEDURE — 80053 COMPREHEN METABOLIC PANEL: CPT

## 2018-10-27 PROCEDURE — G0103 PSA SCREENING: HCPCS

## 2018-10-27 PROCEDURE — 80061 LIPID PANEL: CPT

## 2018-10-27 PROCEDURE — 83036 HEMOGLOBIN GLYCOSYLATED A1C: CPT

## 2018-10-27 PROCEDURE — 87389 HIV-1 AG W/HIV-1&-2 AB AG IA: CPT

## 2018-10-27 PROCEDURE — 86803 HEPATITIS C AB TEST: CPT

## 2018-10-27 PROCEDURE — 82570 ASSAY OF URINE CREATININE: CPT

## 2018-10-27 PROCEDURE — 85025 COMPLETE CBC W/AUTO DIFF WBC: CPT

## 2018-10-27 PROCEDURE — 82043 UR ALBUMIN QUANTITATIVE: CPT

## 2018-10-27 ASSESSMENT — ENCOUNTER SYMPTOMS
SHORTNESS OF BREATH: 0
RHINORRHEA: 0
DIARRHEA: 0
SORE THROAT: 0
VOMITING: 0
NAUSEA: 0
COUGH: 0
CONSTIPATION: 0
ABDOMINAL PAIN: 0

## 2018-10-28 LAB
ESTIMATED AVERAGE GLUCOSE: 114 MG/DL
HBA1C MFR BLD: 5.6 % (ref 4–6)

## 2018-10-31 DIAGNOSIS — E11.9 TYPE 2 DIABETES MELLITUS WITHOUT COMPLICATION, WITHOUT LONG-TERM CURRENT USE OF INSULIN (HCC): ICD-10-CM

## 2019-01-07 ENCOUNTER — OFFICE VISIT (OUTPATIENT)
Dept: PRIMARY CARE CLINIC | Age: 64
End: 2019-01-07
Payer: COMMERCIAL

## 2019-01-07 VITALS
HEIGHT: 72 IN | WEIGHT: 225.2 LBS | SYSTOLIC BLOOD PRESSURE: 173 MMHG | OXYGEN SATURATION: 97 % | DIASTOLIC BLOOD PRESSURE: 82 MMHG | BODY MASS INDEX: 30.5 KG/M2 | TEMPERATURE: 98.4 F | HEART RATE: 75 BPM | RESPIRATION RATE: 20 BRPM

## 2019-01-07 DIAGNOSIS — S46.001A INJURY OF RIGHT ROTATOR CUFF, INITIAL ENCOUNTER: ICD-10-CM

## 2019-01-07 DIAGNOSIS — E78.2 MIXED HYPERLIPIDEMIA: ICD-10-CM
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
## 2019-01-07 DIAGNOSIS — I10 ESSENTIAL HYPERTENSION: ICD-10-CM

## 2019-01-07 DIAGNOSIS — M25.511 ACUTE PAIN OF RIGHT SHOULDER: Primary | ICD-10-CM

## 2019-01-07 PROCEDURE — G8427 DOCREV CUR MEDS BY ELIG CLIN: HCPCS | Performed by: PHYSICIAN ASSISTANT

## 2019-01-07 PROCEDURE — 99214 OFFICE O/P EST MOD 30 MIN: CPT | Performed by: PHYSICIAN ASSISTANT

## 2019-01-07 PROCEDURE — G8484 FLU IMMUNIZE NO ADMIN: HCPCS | Performed by: PHYSICIAN ASSISTANT

## 2019-01-07 PROCEDURE — G8417 CALC BMI ABV UP PARAM F/U: HCPCS | Performed by: PHYSICIAN ASSISTANT

## 2019-01-07 PROCEDURE — 1036F TOBACCO NON-USER: CPT | Performed by: PHYSICIAN ASSISTANT

## 2019-01-07 PROCEDURE — 3017F COLORECTAL CA SCREEN DOC REV: CPT | Performed by: PHYSICIAN ASSISTANT

## 2019-01-07 RX ORDER — SIMVASTATIN 40 MG
40 TABLET ORAL NIGHTLY
Qty: 30 TABLET | Refills: 2 | Status: SHIPPED | OUTPATIENT
Start: 2019-01-07 | End: 2019-01-31 | Stop reason: DRUGHIGH

## 2019-01-13 ASSESSMENT — ENCOUNTER SYMPTOMS
VOMITING: 0
WHEEZING: 0
COUGH: 0
NAUSEA: 0
SHORTNESS OF BREATH: 0
CONSTIPATION: 0
DIARRHEA: 0
BACK PAIN: 0

## 2019-01-15 DIAGNOSIS — M25.511 ACUTE PAIN OF RIGHT SHOULDER: Primary | ICD-10-CM

## 2019-01-15 DIAGNOSIS — G89.4 CHRONIC PAIN SYNDROME: ICD-10-CM

## 2019-01-25 ENCOUNTER — HOSPITAL ENCOUNTER (OUTPATIENT)
Dept: MRI IMAGING | Age: 64
Discharge: HOME | End: 2019-01-27
Payer: COMMERCIAL
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
## 2019-01-25 DIAGNOSIS — M25.511 ACUTE PAIN OF RIGHT SHOULDER: ICD-10-CM

## 2019-01-25 DIAGNOSIS — S46.001A INJURY OF RIGHT ROTATOR CUFF, INITIAL ENCOUNTER: ICD-10-CM

## 2019-01-25 LAB
CREAT SERPL-MCNC: 1.2 MG/DL (ref 0.7–1.2)
GFR AFRICAN AMERICAN: >60 ML/MIN
GFR NON-AFRICAN AMERICAN: >60 ML/MIN
GFR SERPL CREATININE-BSD FRML MDRD: NORMAL ML/MIN/{1.73_M2}
GFR SERPL CREATININE-BSD FRML MDRD: NORMAL ML/MIN/{1.73_M2}

## 2019-01-25 PROCEDURE — 36415 COLL VENOUS BLD VENIPUNCTURE: CPT

## 2019-01-25 PROCEDURE — 73223 MRI JOINT UPR EXTR W/O&W/DYE: CPT

## 2019-01-25 PROCEDURE — A9579 GAD-BASE MR CONTRAST NOS,1ML: HCPCS | Performed by: PHYSICIAN ASSISTANT

## 2019-01-25 PROCEDURE — 6360000004 HC RX CONTRAST MEDICATION: Performed by: PHYSICIAN ASSISTANT

## 2019-01-25 PROCEDURE — 82565 ASSAY OF CREATININE: CPT

## 2019-01-25 RX ADMIN — GADOTERIDOL 20 ML: 279.3 INJECTION, SOLUTION INTRAVENOUS at 17:24

## 2019-01-28 ENCOUNTER — TELEPHONE (OUTPATIENT)
Dept: PRIMARY CARE CLINIC | Age: 64
End: 2019-01-28

## 2019-01-29 ENCOUNTER — OFFICE VISIT (OUTPATIENT)
Dept: PRIMARY CARE CLINIC | Age: 64
End: 2019-01-29
Payer: COMMERCIAL

## 2019-01-29 VITALS
BODY MASS INDEX: 29.97 KG/M2 | OXYGEN SATURATION: 96 % | HEART RATE: 73 BPM | DIASTOLIC BLOOD PRESSURE: 96 MMHG | SYSTOLIC BLOOD PRESSURE: 196 MMHG | TEMPERATURE: 97.9 F | WEIGHT: 221 LBS

## 2019-01-29 DIAGNOSIS — S46.811A TEAR OF INFRASPINATUS TENDON, RIGHT, INITIAL ENCOUNTER: ICD-10-CM

## 2019-01-29 DIAGNOSIS — M67.813 TENDINOSIS OF RIGHT SHOULDER: ICD-10-CM

## 2019-01-29 DIAGNOSIS — E78.2 MIXED HYPERLIPIDEMIA: ICD-10-CM

## 2019-01-29 DIAGNOSIS — I10 ESSENTIAL HYPERTENSION: ICD-10-CM

## 2019-01-29 PROCEDURE — G8427 DOCREV CUR MEDS BY ELIG CLIN: HCPCS | Performed by: PHYSICIAN ASSISTANT

## 2019-01-29 PROCEDURE — G8484 FLU IMMUNIZE NO ADMIN: HCPCS | Performed by: PHYSICIAN ASSISTANT

## 2019-01-29 PROCEDURE — 3017F COLORECTAL CA SCREEN DOC REV: CPT | Performed by: PHYSICIAN ASSISTANT

## 2019-01-29 PROCEDURE — 1036F TOBACCO NON-USER: CPT | Performed by: PHYSICIAN ASSISTANT

## 2019-01-29 PROCEDURE — 99214 OFFICE O/P EST MOD 30 MIN: CPT | Performed by: PHYSICIAN ASSISTANT

## 2019-01-29 PROCEDURE — G8417 CALC BMI ABV UP PARAM F/U: HCPCS | Performed by: PHYSICIAN ASSISTANT

## 2019-01-29 RX ORDER — ZOLPIDEM TARTRATE 12.5 MG/1
12.5 TABLET, FILM COATED, EXTENDED RELEASE ORAL NIGHTLY PRN
COMMUNITY
End: 2019-04-29 | Stop reason: SDUPTHER

## 2019-01-29 RX ORDER — PREGABALIN 75 MG/1
CAPSULE ORAL
COMMUNITY
Start: 2018-12-11 | End: 2019-03-13 | Stop reason: ALTCHOICE

## 2019-01-31 PROBLEM — S46.811A: Status: ACTIVE | Noted: 2019-01-31

## 2019-01-31 PROBLEM — M67.813 TENDINOSIS OF RIGHT SHOULDER: Status: ACTIVE | Noted: 2019-01-31

## 2019-01-31 PROBLEM — M75.101 TEAR OF RIGHT SUPRASPINATUS TENDON: Status: ACTIVE | Noted: 2019-01-31

## 2019-01-31 RX ORDER — SIMVASTATIN 80 MG
80 TABLET ORAL NIGHTLY
Qty: 30 TABLET | Refills: 2 | Status: SHIPPED | OUTPATIENT
Start: 2019-01-31 | End: 2019-03-11 | Stop reason: SDUPTHER

## 2019-01-31 ASSESSMENT — ENCOUNTER SYMPTOMS
CONSTIPATION: 0
COUGH: 0
BACK PAIN: 0
NAUSEA: 0
WHEEZING: 0
SHORTNESS OF BREATH: 0
VOMITING: 0
DIARRHEA: 0

## 2019-02-04 DIAGNOSIS — M25.511 ACUTE PAIN OF RIGHT SHOULDER: Primary | ICD-10-CM

## 2019-02-05 ENCOUNTER — OFFICE VISIT (OUTPATIENT)
Dept: ORTHOPEDIC SURGERY | Age: 64
End: 2019-02-05
Payer: COMMERCIAL

## 2019-02-05 VITALS — WEIGHT: 221 LBS | HEIGHT: 72 IN | BODY MASS INDEX: 29.93 KG/M2

## 2019-02-05 DIAGNOSIS — M75.121 COMPLETE TEAR OF RIGHT ROTATOR CUFF: Primary | ICD-10-CM

## 2019-02-05 PROCEDURE — 3017F COLORECTAL CA SCREEN DOC REV: CPT | Performed by: ORTHOPAEDIC SURGERY

## 2019-02-05 PROCEDURE — 1036F TOBACCO NON-USER: CPT | Performed by: ORTHOPAEDIC SURGERY

## 2019-02-05 PROCEDURE — G8427 DOCREV CUR MEDS BY ELIG CLIN: HCPCS | Performed by: ORTHOPAEDIC SURGERY

## 2019-02-05 PROCEDURE — G8484 FLU IMMUNIZE NO ADMIN: HCPCS | Performed by: ORTHOPAEDIC SURGERY

## 2019-02-05 PROCEDURE — G8417 CALC BMI ABV UP PARAM F/U: HCPCS | Performed by: ORTHOPAEDIC SURGERY

## 2019-02-05 PROCEDURE — 99203 OFFICE O/P NEW LOW 30 MIN: CPT | Performed by: ORTHOPAEDIC SURGERY

## 2019-02-20 ENCOUNTER — HOSPITAL ENCOUNTER (OUTPATIENT)
Dept: PREADMISSION TESTING | Age: 64
Discharge: HOME | End: 2019-02-24
Payer: COMMERCIAL

## 2019-02-21 RX ORDER — SODIUM CHLORIDE 0.9 % (FLUSH) 0.9 %
10 SYRINGE (ML) INJECTION PRN
Status: CANCELLED | OUTPATIENT
Start: 2019-02-21

## 2019-02-21 RX ORDER — LIDOCAINE HYDROCHLORIDE 10 MG/ML
1 INJECTION, SOLUTION EPIDURAL; INFILTRATION; INTRACAUDAL; PERINEURAL
Status: CANCELLED | OUTPATIENT
Start: 2019-02-21 | End: 2019-02-21

## 2019-02-21 RX ORDER — SODIUM CHLORIDE 9 MG/ML
INJECTION, SOLUTION INTRAVENOUS CONTINUOUS
Status: CANCELLED | OUTPATIENT
Start: 2019-02-21

## 2019-02-21 RX ORDER — SODIUM CHLORIDE 0.9 % (FLUSH) 0.9 %
10 SYRINGE (ML) INJECTION EVERY 12 HOURS SCHEDULED
Status: CANCELLED | OUTPATIENT
Start: 2019-02-21

## 2019-03-07 ENCOUNTER — OFFICE VISIT (OUTPATIENT)
Dept: ORTHOPEDIC SURGERY | Age: 64
End: 2019-03-07

## 2019-03-07 VITALS — BODY MASS INDEX: 29.92 KG/M2 | HEIGHT: 72 IN | WEIGHT: 220.9 LBS

## 2019-03-07 DIAGNOSIS — M75.121 COMPLETE TEAR OF RIGHT ROTATOR CUFF: Primary | ICD-10-CM

## 2019-03-07 PROCEDURE — 99024 POSTOP FOLLOW-UP VISIT: CPT | Performed by: ORTHOPAEDIC SURGERY

## 2019-03-11 DIAGNOSIS — E78.2 MIXED HYPERLIPIDEMIA: ICD-10-CM

## 2019-03-11 RX ORDER — SIMVASTATIN 80 MG
80 TABLET ORAL NIGHTLY
Qty: 90 TABLET | Refills: 0 | Status: SHIPPED | OUTPATIENT
Start: 2019-03-11 | End: 2019-09-16 | Stop reason: SDUPTHER

## 2019-03-12 ENCOUNTER — OFFICE VISIT (OUTPATIENT)
Dept: ORTHOPEDIC SURGERY | Age: 64
End: 2019-03-12

## 2019-03-12 VITALS — HEIGHT: 72 IN | BODY MASS INDEX: 29.92 KG/M2 | WEIGHT: 220.9 LBS

## 2019-03-12 DIAGNOSIS — M75.121 COMPLETE TEAR OF RIGHT ROTATOR CUFF: Primary | ICD-10-CM

## 2019-03-12 PROCEDURE — 99024 POSTOP FOLLOW-UP VISIT: CPT | Performed by: ORTHOPAEDIC SURGERY

## 2019-03-12 RX ORDER — HYDROCODONE BITARTRATE AND ACETAMINOPHEN 5; 325 MG/1; MG/1
1 TABLET ORAL EVERY 4 HOURS PRN
Qty: 42 TABLET | Refills: 0 | Status: SHIPPED | OUTPATIENT
Start: 2019-03-12 | End: 2019-03-19

## 2019-03-12 RX ORDER — ONDANSETRON 4 MG/1
4 TABLET, FILM COATED ORAL DAILY PRN
Qty: 20 TABLET | Refills: 0 | Status: SHIPPED | OUTPATIENT
Start: 2019-03-12 | End: 2020-01-06 | Stop reason: ALTCHOICE

## 2019-03-13 ENCOUNTER — HOSPITAL ENCOUNTER (OUTPATIENT)
Dept: PREADMISSION TESTING | Age: 64
Discharge: HOME | End: 2019-03-17
Payer: COMMERCIAL

## 2019-03-13 VITALS
SYSTOLIC BLOOD PRESSURE: 181 MMHG | DIASTOLIC BLOOD PRESSURE: 80 MMHG | WEIGHT: 221 LBS | HEART RATE: 78 BPM | OXYGEN SATURATION: 99 % | RESPIRATION RATE: 20 BRPM | HEIGHT: 72 IN | BODY MASS INDEX: 29.93 KG/M2 | TEMPERATURE: 98.2 F

## 2019-03-13 LAB
ABSOLUTE EOS #: 0.2 K/UL (ref 0–0.4)
ABSOLUTE IMMATURE GRANULOCYTE: ABNORMAL K/UL (ref 0–0.3)
ABSOLUTE LYMPH #: 1.8 K/UL (ref 1–4.8)
ABSOLUTE MONO #: 0.4 K/UL (ref 0.1–1.3)
ANION GAP SERPL CALCULATED.3IONS-SCNC: 11 MMOL/L (ref 9–17)
BASOPHILS # BLD: 1 % (ref 0–2)
BASOPHILS ABSOLUTE: 0 K/UL (ref 0–0.2)
BUN BLDV-MCNC: 14 MG/DL (ref 8–23)
BUN/CREAT BLD: ABNORMAL (ref 9–20)
CALCIUM SERPL-MCNC: 9.5 MG/DL (ref 8.6–10.4)
CHLORIDE BLD-SCNC: 104 MMOL/L (ref 98–107)
CO2: 27 MMOL/L (ref 20–31)
CREAT SERPL-MCNC: 0.99 MG/DL (ref 0.7–1.2)
DIFFERENTIAL TYPE: ABNORMAL
EOSINOPHILS RELATIVE PERCENT: 3 % (ref 0–4)
GFR AFRICAN AMERICAN: >60 ML/MIN
GFR NON-AFRICAN AMERICAN: >60 ML/MIN
GFR SERPL CREATININE-BSD FRML MDRD: ABNORMAL ML/MIN/{1.73_M2}
GFR SERPL CREATININE-BSD FRML MDRD: ABNORMAL ML/MIN/{1.73_M2}
GLUCOSE BLD-MCNC: 110 MG/DL (ref 70–99)
HCT VFR BLD CALC: 38.8 % (ref 41–53)
HEMOGLOBIN: 12.8 G/DL (ref 13.5–17.5)
IMMATURE GRANULOCYTES: ABNORMAL %
LYMPHOCYTES # BLD: 28 % (ref 24–44)
MCH RBC QN AUTO: 27.9 PG (ref 26–34)
MCHC RBC AUTO-ENTMCNC: 33 G/DL (ref 31–37)
MCV RBC AUTO: 84.5 FL (ref 80–100)
MONOCYTES # BLD: 7 % (ref 1–7)
NRBC AUTOMATED: ABNORMAL PER 100 WBC
PDW BLD-RTO: 12.7 % (ref 11.5–14.9)
PLATELET # BLD: 237 K/UL (ref 150–450)
PLATELET ESTIMATE: ABNORMAL
PMV BLD AUTO: 8.1 FL (ref 6–12)
POTASSIUM SERPL-SCNC: 4.8 MMOL/L (ref 3.7–5.3)
RBC # BLD: 4.59 M/UL (ref 4.5–5.9)
RBC # BLD: ABNORMAL 10*6/UL
SEG NEUTROPHILS: 61 % (ref 36–66)
SEGMENTED NEUTROPHILS ABSOLUTE COUNT: 3.9 K/UL (ref 1.3–9.1)
SODIUM BLD-SCNC: 142 MMOL/L (ref 135–144)
WBC # BLD: 6.4 K/UL (ref 3.5–11)
WBC # BLD: ABNORMAL 10*3/UL

## 2019-03-13 PROCEDURE — 93005 ELECTROCARDIOGRAM TRACING: CPT

## 2019-03-13 PROCEDURE — 80048 BASIC METABOLIC PNL TOTAL CA: CPT

## 2019-03-13 PROCEDURE — 36415 COLL VENOUS BLD VENIPUNCTURE: CPT

## 2019-03-13 PROCEDURE — 85025 COMPLETE CBC W/AUTO DIFF WBC: CPT

## 2019-03-13 ASSESSMENT — ENCOUNTER SYMPTOMS
ABDOMINAL DISTENTION: 0
APNEA: 0
ALLERGIC/IMMUNOLOGIC NEGATIVE: 1
SORE THROAT: 0
DIARRHEA: 0
COUGH: 0
SINUS PRESSURE: 0
EYES NEGATIVE: 1
NAUSEA: 0
SHORTNESS OF BREATH: 0

## 2019-03-14 ENCOUNTER — TELEPHONE (OUTPATIENT)
Dept: ORTHOPEDIC SURGERY | Age: 64
End: 2019-03-14

## 2019-03-14 ENCOUNTER — ANESTHESIA EVENT (OUTPATIENT)
Dept: OPERATING ROOM | Age: 64
End: 2019-03-14
Payer: COMMERCIAL

## 2019-03-14 LAB
EKG ATRIAL RATE: 71 BPM
EKG P AXIS: 57 DEGREES
EKG P-R INTERVAL: 178 MS
EKG Q-T INTERVAL: 380 MS
EKG QRS DURATION: 90 MS
EKG QTC CALCULATION (BAZETT): 412 MS
EKG R AXIS: 42 DEGREES
EKG T AXIS: 15 DEGREES
EKG VENTRICULAR RATE: 71 BPM

## 2019-03-14 RX ORDER — SODIUM CHLORIDE 9 MG/ML
INJECTION, SOLUTION INTRAVENOUS CONTINUOUS
Status: CANCELLED | OUTPATIENT
Start: 2019-03-14

## 2019-03-14 RX ORDER — SODIUM CHLORIDE 0.9 % (FLUSH) 0.9 %
10 SYRINGE (ML) INJECTION EVERY 12 HOURS SCHEDULED
Status: CANCELLED | OUTPATIENT
Start: 2019-03-14

## 2019-03-14 RX ORDER — LIDOCAINE HYDROCHLORIDE 10 MG/ML
1 INJECTION, SOLUTION EPIDURAL; INFILTRATION; INTRACAUDAL; PERINEURAL
Status: CANCELLED | OUTPATIENT
Start: 2019-03-14 | End: 2019-03-14

## 2019-03-14 RX ORDER — SODIUM CHLORIDE 0.9 % (FLUSH) 0.9 %
10 SYRINGE (ML) INJECTION PRN
Status: CANCELLED | OUTPATIENT
Start: 2019-03-14

## 2019-03-15 ENCOUNTER — OFFICE VISIT (OUTPATIENT)
Dept: PRIMARY CARE CLINIC | Age: 64
End: 2019-03-15
Payer: COMMERCIAL

## 2019-03-15 VITALS
TEMPERATURE: 98.6 F | WEIGHT: 216 LBS | RESPIRATION RATE: 20 BRPM | SYSTOLIC BLOOD PRESSURE: 148 MMHG | HEIGHT: 72 IN | DIASTOLIC BLOOD PRESSURE: 85 MMHG | HEART RATE: 97 BPM | OXYGEN SATURATION: 98 % | BODY MASS INDEX: 29.26 KG/M2

## 2019-03-15 DIAGNOSIS — Z01.818 PREOP EXAMINATION: Primary | ICD-10-CM

## 2019-03-15 DIAGNOSIS — M75.121 COMPLETE TEAR OF RIGHT ROTATOR CUFF: ICD-10-CM

## 2019-03-15 DIAGNOSIS — I10 ESSENTIAL HYPERTENSION: ICD-10-CM

## 2019-03-15 PROCEDURE — G8484 FLU IMMUNIZE NO ADMIN: HCPCS | Performed by: PHYSICIAN ASSISTANT

## 2019-03-15 PROCEDURE — G8417 CALC BMI ABV UP PARAM F/U: HCPCS | Performed by: PHYSICIAN ASSISTANT

## 2019-03-15 PROCEDURE — 3017F COLORECTAL CA SCREEN DOC REV: CPT | Performed by: PHYSICIAN ASSISTANT

## 2019-03-15 PROCEDURE — 1036F TOBACCO NON-USER: CPT | Performed by: PHYSICIAN ASSISTANT

## 2019-03-15 PROCEDURE — G8427 DOCREV CUR MEDS BY ELIG CLIN: HCPCS | Performed by: PHYSICIAN ASSISTANT

## 2019-03-15 PROCEDURE — 99214 OFFICE O/P EST MOD 30 MIN: CPT | Performed by: PHYSICIAN ASSISTANT

## 2019-03-15 RX ORDER — LISINOPRIL 40 MG/1
40 TABLET ORAL DAILY
Qty: 30 TABLET | Refills: 2 | Status: SHIPPED | OUTPATIENT
Start: 2019-03-15 | End: 2019-09-16 | Stop reason: SDUPTHER

## 2019-03-15 ASSESSMENT — PATIENT HEALTH QUESTIONNAIRE - PHQ9
SUM OF ALL RESPONSES TO PHQ9 QUESTIONS 1 & 2: 0
SUM OF ALL RESPONSES TO PHQ QUESTIONS 1-9: 0
SUM OF ALL RESPONSES TO PHQ QUESTIONS 1-9: 0
1. LITTLE INTEREST OR PLEASURE IN DOING THINGS: 0
2. FEELING DOWN, DEPRESSED OR HOPELESS: 0

## 2019-03-19 ENCOUNTER — TELEPHONE (OUTPATIENT)
Dept: PRIMARY CARE CLINIC | Age: 64
End: 2019-03-19

## 2019-03-20 ENCOUNTER — OFFICE VISIT (OUTPATIENT)
Dept: PRIMARY CARE CLINIC | Age: 64
End: 2019-03-20
Payer: COMMERCIAL

## 2019-03-20 VITALS
BODY MASS INDEX: 28.89 KG/M2 | WEIGHT: 213 LBS | DIASTOLIC BLOOD PRESSURE: 80 MMHG | SYSTOLIC BLOOD PRESSURE: 137 MMHG | HEART RATE: 101 BPM

## 2019-03-20 DIAGNOSIS — R09.81 NASAL CONGESTION: ICD-10-CM

## 2019-03-20 DIAGNOSIS — J01.00 ACUTE NON-RECURRENT MAXILLARY SINUSITIS: Primary | ICD-10-CM

## 2019-03-20 PROCEDURE — 3017F COLORECTAL CA SCREEN DOC REV: CPT | Performed by: NURSE PRACTITIONER

## 2019-03-20 PROCEDURE — 99202 OFFICE O/P NEW SF 15 MIN: CPT | Performed by: NURSE PRACTITIONER

## 2019-03-20 PROCEDURE — G8484 FLU IMMUNIZE NO ADMIN: HCPCS | Performed by: NURSE PRACTITIONER

## 2019-03-20 PROCEDURE — G8417 CALC BMI ABV UP PARAM F/U: HCPCS | Performed by: NURSE PRACTITIONER

## 2019-03-20 PROCEDURE — 1036F TOBACCO NON-USER: CPT | Performed by: NURSE PRACTITIONER

## 2019-03-20 PROCEDURE — G8427 DOCREV CUR MEDS BY ELIG CLIN: HCPCS | Performed by: NURSE PRACTITIONER

## 2019-03-20 RX ORDER — AMOXICILLIN AND CLAVULANATE POTASSIUM 875; 125 MG/1; MG/1
1 TABLET, FILM COATED ORAL 2 TIMES DAILY
Qty: 14 TABLET | Refills: 0 | Status: SHIPPED | OUTPATIENT
Start: 2019-03-20 | End: 2019-03-27

## 2019-03-20 RX ORDER — FLUTICASONE PROPIONATE 50 MCG
2 SPRAY, SUSPENSION (ML) NASAL DAILY
Qty: 1 BOTTLE | Refills: 0 | Status: SHIPPED | OUTPATIENT
Start: 2019-03-20 | End: 2021-11-18 | Stop reason: ALTCHOICE

## 2019-03-20 ASSESSMENT — ENCOUNTER SYMPTOMS
BACK PAIN: 0
NAUSEA: 0
SHORTNESS OF BREATH: 0
SINUS PAIN: 1
DIARRHEA: 0
CONSTIPATION: 0
RHINORRHEA: 1
VOMITING: 0
SINUS PRESSURE: 1
SHORTNESS OF BREATH: 0
COUGH: 0
SORE THROAT: 0
WHEEZING: 0
COUGH: 0

## 2019-03-21 ENCOUNTER — PREP FOR PROCEDURE (OUTPATIENT)
Dept: ORTHOPEDIC SURGERY | Age: 64
End: 2019-03-21

## 2019-03-21 RX ORDER — SODIUM CHLORIDE 0.9 % (FLUSH) 0.9 %
10 SYRINGE (ML) INJECTION PRN
Status: CANCELLED | OUTPATIENT
Start: 2019-03-21

## 2019-03-21 RX ORDER — SODIUM CHLORIDE 0.9 % (FLUSH) 0.9 %
10 SYRINGE (ML) INJECTION EVERY 12 HOURS SCHEDULED
Status: CANCELLED | OUTPATIENT
Start: 2019-03-21

## 2019-03-22 ENCOUNTER — ANESTHESIA (OUTPATIENT)
Dept: OPERATING ROOM | Age: 64
End: 2019-03-22
Payer: COMMERCIAL

## 2019-03-22 ENCOUNTER — HOSPITAL ENCOUNTER (OUTPATIENT)
Age: 64
Setting detail: OUTPATIENT SURGERY
Discharge: HOME | End: 2019-03-22
Attending: ORTHOPAEDIC SURGERY | Admitting: ORTHOPAEDIC SURGERY
Payer: COMMERCIAL

## 2019-03-22 VITALS
BODY MASS INDEX: 29.93 KG/M2 | HEIGHT: 72 IN | HEART RATE: 81 BPM | SYSTOLIC BLOOD PRESSURE: 160 MMHG | OXYGEN SATURATION: 99 % | DIASTOLIC BLOOD PRESSURE: 76 MMHG | WEIGHT: 221 LBS | RESPIRATION RATE: 17 BRPM | TEMPERATURE: 97.9 F

## 2019-03-22 VITALS — SYSTOLIC BLOOD PRESSURE: 124 MMHG | DIASTOLIC BLOOD PRESSURE: 70 MMHG | TEMPERATURE: 97.3 F | OXYGEN SATURATION: 100 %

## 2019-03-22 LAB
GLUCOSE BLD-MCNC: 119 MG/DL (ref 75–110)
GLUCOSE BLD-MCNC: 139 MG/DL (ref 75–110)

## 2019-03-22 PROCEDURE — 29823 SHO ARTHRS SRG XTNSV DBRDMT: CPT | Performed by: ORTHOPAEDIC SURGERY

## 2019-03-22 PROCEDURE — 3700000000 HC ANESTHESIA ATTENDED CARE: Performed by: ORTHOPAEDIC SURGERY

## 2019-03-22 PROCEDURE — 3600000004 HC SURGERY LEVEL 4 BASE: Performed by: ORTHOPAEDIC SURGERY

## 2019-03-22 PROCEDURE — 3700000001 HC ADD 15 MINUTES (ANESTHESIA): Performed by: ORTHOPAEDIC SURGERY

## 2019-03-22 PROCEDURE — 3600000014 HC SURGERY LEVEL 4 ADDTL 15MIN: Performed by: ORTHOPAEDIC SURGERY

## 2019-03-22 PROCEDURE — 7100000001 HC PACU RECOVERY - ADDTL 15 MIN: Performed by: ORTHOPAEDIC SURGERY

## 2019-03-22 PROCEDURE — 2709999900 HC NON-CHARGEABLE SUPPLY: Performed by: ORTHOPAEDIC SURGERY

## 2019-03-22 PROCEDURE — 6360000002 HC RX W HCPCS: Performed by: ANESTHESIOLOGY

## 2019-03-22 PROCEDURE — 2500000003 HC RX 250 WO HCPCS

## 2019-03-22 PROCEDURE — 6360000002 HC RX W HCPCS

## 2019-03-22 PROCEDURE — 2720000010 HC SURG SUPPLY STERILE: Performed by: ORTHOPAEDIC SURGERY

## 2019-03-22 PROCEDURE — 2580000003 HC RX 258: Performed by: ANESTHESIOLOGY

## 2019-03-22 PROCEDURE — 29827 SHO ARTHRS SRG RT8TR CUF RPR: CPT | Performed by: ORTHOPAEDIC SURGERY

## 2019-03-22 PROCEDURE — 6360000002 HC RX W HCPCS: Performed by: ORTHOPAEDIC SURGERY

## 2019-03-22 PROCEDURE — 64415 NJX AA&/STRD BRCH PLXS IMG: CPT | Performed by: ANESTHESIOLOGY

## 2019-03-22 PROCEDURE — C1713 ANCHOR/SCREW BN/BN,TIS/BN: HCPCS | Performed by: ORTHOPAEDIC SURGERY

## 2019-03-22 PROCEDURE — L3650 SO 8 ABD RESTRAINT PRE OTS: HCPCS | Performed by: ORTHOPAEDIC SURGERY

## 2019-03-22 PROCEDURE — 7100000000 HC PACU RECOVERY - FIRST 15 MIN: Performed by: ORTHOPAEDIC SURGERY

## 2019-03-22 PROCEDURE — 82947 ASSAY GLUCOSE BLOOD QUANT: CPT

## 2019-03-22 DEVICE — ANCHOR SUT L16.3MM DIA5.5MM TI W/ TWO SZ 2 FIBERWIRE CRKSCR: Type: IMPLANTABLE DEVICE | Site: SHOULDER | Status: FUNCTIONAL

## 2019-03-22 DEVICE — ANCHOR SUT L24.5MM DIA4.75MM BIOCOMPOSITE SELF PUNCHING: Type: IMPLANTABLE DEVICE | Site: SHOULDER | Status: FUNCTIONAL

## 2019-03-22 RX ORDER — LIDOCAINE HYDROCHLORIDE 10 MG/ML
INJECTION, SOLUTION EPIDURAL; INFILTRATION; INTRACAUDAL; PERINEURAL PRN
Status: DISCONTINUED | OUTPATIENT
Start: 2019-03-22 | End: 2019-03-22 | Stop reason: SDUPTHER

## 2019-03-22 RX ORDER — OXYCODONE HYDROCHLORIDE AND ACETAMINOPHEN 5; 325 MG/1; MG/1
1 TABLET ORAL EVERY 6 HOURS PRN
Qty: 28 TABLET | Refills: 0 | Status: SHIPPED | OUTPATIENT
Start: 2019-03-22 | End: 2019-03-29

## 2019-03-22 RX ORDER — SODIUM CHLORIDE 0.9 % (FLUSH) 0.9 %
10 SYRINGE (ML) INJECTION EVERY 12 HOURS SCHEDULED
Status: CANCELLED | OUTPATIENT
Start: 2019-03-22

## 2019-03-22 RX ORDER — SODIUM CHLORIDE 0.9 % (FLUSH) 0.9 %
10 SYRINGE (ML) INJECTION PRN
Status: DISCONTINUED | OUTPATIENT
Start: 2019-03-22 | End: 2019-03-22 | Stop reason: HOSPADM

## 2019-03-22 RX ORDER — SODIUM CHLORIDE 9 MG/ML
INJECTION, SOLUTION INTRAVENOUS CONTINUOUS
Status: DISCONTINUED | OUTPATIENT
Start: 2019-03-22 | End: 2019-03-22 | Stop reason: HOSPADM

## 2019-03-22 RX ORDER — MIDAZOLAM HYDROCHLORIDE 1 MG/ML
INJECTION INTRAMUSCULAR; INTRAVENOUS PRN
Status: DISCONTINUED | OUTPATIENT
Start: 2019-03-22 | End: 2019-03-22 | Stop reason: SDUPTHER

## 2019-03-22 RX ORDER — SODIUM CHLORIDE 0.9 % (FLUSH) 0.9 %
10 SYRINGE (ML) INJECTION PRN
Status: CANCELLED | OUTPATIENT
Start: 2019-03-22

## 2019-03-22 RX ORDER — PROPOFOL 10 MG/ML
INJECTION, EMULSION INTRAVENOUS PRN
Status: DISCONTINUED | OUTPATIENT
Start: 2019-03-22 | End: 2019-03-22 | Stop reason: SDUPTHER

## 2019-03-22 RX ORDER — SODIUM CHLORIDE 0.9 % (FLUSH) 0.9 %
10 SYRINGE (ML) INJECTION EVERY 12 HOURS SCHEDULED
Status: DISCONTINUED | OUTPATIENT
Start: 2019-03-22 | End: 2019-03-22 | Stop reason: HOSPADM

## 2019-03-22 RX ORDER — KETOROLAC TROMETHAMINE 30 MG/ML
INJECTION, SOLUTION INTRAMUSCULAR; INTRAVENOUS PRN
Status: DISCONTINUED | OUTPATIENT
Start: 2019-03-22 | End: 2019-03-22 | Stop reason: SDUPTHER

## 2019-03-22 RX ORDER — ROPIVACAINE HYDROCHLORIDE 5 MG/ML
INJECTION, SOLUTION EPIDURAL; INFILTRATION; PERINEURAL
Status: COMPLETED | OUTPATIENT
Start: 2019-03-22 | End: 2019-03-22

## 2019-03-22 RX ORDER — ONDANSETRON 2 MG/ML
INJECTION INTRAMUSCULAR; INTRAVENOUS PRN
Status: DISCONTINUED | OUTPATIENT
Start: 2019-03-22 | End: 2019-03-22 | Stop reason: SDUPTHER

## 2019-03-22 RX ORDER — ROCURONIUM BROMIDE 10 MG/ML
INJECTION, SOLUTION INTRAVENOUS PRN
Status: DISCONTINUED | OUTPATIENT
Start: 2019-03-22 | End: 2019-03-22 | Stop reason: SDUPTHER

## 2019-03-22 RX ORDER — LIDOCAINE HYDROCHLORIDE 10 MG/ML
1 INJECTION, SOLUTION EPIDURAL; INFILTRATION; INTRACAUDAL; PERINEURAL
Status: DISCONTINUED | OUTPATIENT
Start: 2019-03-22 | End: 2019-03-22 | Stop reason: HOSPADM

## 2019-03-22 RX ORDER — FENTANYL CITRATE 50 UG/ML
INJECTION, SOLUTION INTRAMUSCULAR; INTRAVENOUS PRN
Status: DISCONTINUED | OUTPATIENT
Start: 2019-03-22 | End: 2019-03-22 | Stop reason: SDUPTHER

## 2019-03-22 RX ADMIN — ONDANSETRON 4 MG: 2 INJECTION INTRAMUSCULAR; INTRAVENOUS at 16:03

## 2019-03-22 RX ADMIN — PROPOFOL 200 MG: 10 INJECTION, EMULSION INTRAVENOUS at 15:51

## 2019-03-22 RX ADMIN — MIDAZOLAM 2 MG: 1 INJECTION INTRAMUSCULAR; INTRAVENOUS at 15:04

## 2019-03-22 RX ADMIN — FENTANYL CITRATE 50 MCG: 50 INJECTION, SOLUTION INTRAMUSCULAR; INTRAVENOUS at 17:25

## 2019-03-22 RX ADMIN — ROPIVACAINE HYDROCHLORIDE 20 ML: 5 INJECTION, SOLUTION EPIDURAL; INFILTRATION; PERINEURAL at 15:18

## 2019-03-22 RX ADMIN — SODIUM CHLORIDE: 9 INJECTION, SOLUTION INTRAVENOUS at 14:03

## 2019-03-22 RX ADMIN — PHENYLEPHRINE HYDROCHLORIDE 100 MCG: 10 INJECTION INTRAVENOUS at 16:49

## 2019-03-22 RX ADMIN — PHENYLEPHRINE HYDROCHLORIDE 100 MCG: 10 INJECTION INTRAVENOUS at 18:05

## 2019-03-22 RX ADMIN — ROCURONIUM BROMIDE 50 MG: 10 INJECTION INTRAVENOUS at 15:51

## 2019-03-22 RX ADMIN — FENTANYL CITRATE 100 MCG: 50 INJECTION, SOLUTION INTRAMUSCULAR; INTRAVENOUS at 15:51

## 2019-03-22 RX ADMIN — SODIUM CHLORIDE: 9 INJECTION, SOLUTION INTRAVENOUS at 16:58

## 2019-03-22 RX ADMIN — FENTANYL CITRATE 50 MCG: 50 INJECTION, SOLUTION INTRAMUSCULAR; INTRAVENOUS at 16:47

## 2019-03-22 RX ADMIN — PHENYLEPHRINE HYDROCHLORIDE 100 MCG: 10 INJECTION INTRAVENOUS at 16:23

## 2019-03-22 RX ADMIN — KETOROLAC TROMETHAMINE 30 MG: 30 INJECTION, SOLUTION INTRAMUSCULAR at 18:14

## 2019-03-22 RX ADMIN — Medication 2 G: at 15:56

## 2019-03-22 RX ADMIN — LIDOCAINE HYDROCHLORIDE 50 MG: 10 INJECTION, SOLUTION EPIDURAL; INFILTRATION; INTRACAUDAL; PERINEURAL at 15:51

## 2019-03-22 RX ADMIN — PHENYLEPHRINE HYDROCHLORIDE 100 MCG: 10 INJECTION INTRAVENOUS at 16:15

## 2019-03-22 RX ADMIN — FENTANYL CITRATE 50 MCG: 50 INJECTION, SOLUTION INTRAMUSCULAR; INTRAVENOUS at 18:14

## 2019-03-22 ASSESSMENT — PULMONARY FUNCTION TESTS
PIF_VALUE: 22
PIF_VALUE: 16
PIF_VALUE: 17
PIF_VALUE: 18
PIF_VALUE: 18
PIF_VALUE: 16
PIF_VALUE: 17
PIF_VALUE: 16
PIF_VALUE: 15
PIF_VALUE: 18
PIF_VALUE: 12
PIF_VALUE: 15
PIF_VALUE: 14
PIF_VALUE: 25
PIF_VALUE: 15
PIF_VALUE: 17
PIF_VALUE: 15
PIF_VALUE: 17
PIF_VALUE: 18
PIF_VALUE: 20
PIF_VALUE: 13
PIF_VALUE: 21
PIF_VALUE: 16
PIF_VALUE: 16
PIF_VALUE: 17
PIF_VALUE: 16
PIF_VALUE: 17
PIF_VALUE: 14
PIF_VALUE: 1
PIF_VALUE: 20
PIF_VALUE: 15
PIF_VALUE: 17
PIF_VALUE: 20
PIF_VALUE: 17
PIF_VALUE: 16
PIF_VALUE: 17
PIF_VALUE: 16
PIF_VALUE: 15
PIF_VALUE: 15
PIF_VALUE: 17
PIF_VALUE: 15
PIF_VALUE: 17
PIF_VALUE: 3
PIF_VALUE: 15
PIF_VALUE: 15
PIF_VALUE: 17
PIF_VALUE: 15
PIF_VALUE: 17
PIF_VALUE: 22
PIF_VALUE: 17
PIF_VALUE: 13
PIF_VALUE: 19
PIF_VALUE: 15
PIF_VALUE: 16
PIF_VALUE: 15
PIF_VALUE: 13
PIF_VALUE: 21
PIF_VALUE: 13
PIF_VALUE: 16
PIF_VALUE: 17
PIF_VALUE: 19
PIF_VALUE: 18
PIF_VALUE: 15
PIF_VALUE: 15
PIF_VALUE: 2
PIF_VALUE: 3
PIF_VALUE: 18
PIF_VALUE: 16
PIF_VALUE: 13
PIF_VALUE: 13
PIF_VALUE: 18
PIF_VALUE: 15
PIF_VALUE: 15
PIF_VALUE: 13
PIF_VALUE: 13
PIF_VALUE: 15
PIF_VALUE: 17
PIF_VALUE: 16
PIF_VALUE: 15
PIF_VALUE: 15
PIF_VALUE: 17
PIF_VALUE: 15
PIF_VALUE: 15
PIF_VALUE: 16
PIF_VALUE: 15
PIF_VALUE: 14
PIF_VALUE: 16
PIF_VALUE: 2
PIF_VALUE: 16
PIF_VALUE: 15
PIF_VALUE: 17
PIF_VALUE: 19
PIF_VALUE: 15
PIF_VALUE: 15
PIF_VALUE: 13
PIF_VALUE: 13
PIF_VALUE: 17
PIF_VALUE: 16
PIF_VALUE: 15
PIF_VALUE: 17
PIF_VALUE: 15
PIF_VALUE: 17
PIF_VALUE: 15
PIF_VALUE: 19
PIF_VALUE: 17
PIF_VALUE: 15
PIF_VALUE: 17
PIF_VALUE: 25
PIF_VALUE: 16
PIF_VALUE: 16
PIF_VALUE: 21
PIF_VALUE: 17
PIF_VALUE: 16
PIF_VALUE: 13
PIF_VALUE: 17
PIF_VALUE: 17
PIF_VALUE: 35
PIF_VALUE: 17
PIF_VALUE: 17
PIF_VALUE: 16
PIF_VALUE: 16
PIF_VALUE: 17
PIF_VALUE: 2
PIF_VALUE: 16
PIF_VALUE: 16
PIF_VALUE: 1
PIF_VALUE: 20
PIF_VALUE: 3
PIF_VALUE: 17
PIF_VALUE: 24
PIF_VALUE: 22
PIF_VALUE: 2
PIF_VALUE: 15
PIF_VALUE: 16
PIF_VALUE: 16
PIF_VALUE: 22
PIF_VALUE: 23
PIF_VALUE: 15
PIF_VALUE: 2
PIF_VALUE: 13
PIF_VALUE: 16
PIF_VALUE: 27
PIF_VALUE: 17
PIF_VALUE: 1
PIF_VALUE: 17
PIF_VALUE: 17
PIF_VALUE: 16
PIF_VALUE: 16
PIF_VALUE: 17
PIF_VALUE: 15
PIF_VALUE: 22
PIF_VALUE: 21
PIF_VALUE: 15
PIF_VALUE: 16
PIF_VALUE: 16
PIF_VALUE: 20
PIF_VALUE: 15
PIF_VALUE: 16
PIF_VALUE: 16
PIF_VALUE: 15
PIF_VALUE: 16
PIF_VALUE: 20
PIF_VALUE: 17
PIF_VALUE: 17
PIF_VALUE: 21

## 2019-03-22 ASSESSMENT — PAIN SCALES - GENERAL: PAINLEVEL_OUTOF10: 0

## 2019-03-22 ASSESSMENT — LIFESTYLE VARIABLES: SMOKING_STATUS: 0

## 2019-03-22 ASSESSMENT — ENCOUNTER SYMPTOMS
SHORTNESS OF BREATH: 0
STRIDOR: 0

## 2019-03-22 ASSESSMENT — PAIN - FUNCTIONAL ASSESSMENT: PAIN_FUNCTIONAL_ASSESSMENT: 0-10

## 2019-03-22 ASSESSMENT — PAIN DESCRIPTION - LOCATION: LOCATION: SHOULDER

## 2019-03-22 ASSESSMENT — PAIN DESCRIPTION - ORIENTATION: ORIENTATION: RIGHT

## 2019-04-05 ENCOUNTER — OFFICE VISIT (OUTPATIENT)
Dept: ORTHOPEDIC SURGERY | Age: 64
End: 2019-04-05

## 2019-04-05 VITALS — WEIGHT: 217 LBS | BODY MASS INDEX: 29.39 KG/M2 | HEIGHT: 72 IN

## 2019-04-05 DIAGNOSIS — Z98.890 STATUS POST ROTATOR CUFF REPAIR: Primary | ICD-10-CM

## 2019-04-05 PROCEDURE — 99024 POSTOP FOLLOW-UP VISIT: CPT | Performed by: ORTHOPAEDIC SURGERY

## 2019-04-15 ENCOUNTER — OFFICE VISIT (OUTPATIENT)
Dept: PRIMARY CARE CLINIC | Age: 64
End: 2019-04-15
Payer: COMMERCIAL

## 2019-04-15 VITALS
SYSTOLIC BLOOD PRESSURE: 161 MMHG | OXYGEN SATURATION: 97 % | HEIGHT: 72 IN | HEART RATE: 81 BPM | WEIGHT: 219 LBS | TEMPERATURE: 97.7 F | BODY MASS INDEX: 29.66 KG/M2 | RESPIRATION RATE: 20 BRPM | DIASTOLIC BLOOD PRESSURE: 82 MMHG

## 2019-04-15 DIAGNOSIS — M47.817 DJD (DEGENERATIVE JOINT DISEASE), LUMBOSACRAL: ICD-10-CM

## 2019-04-15 DIAGNOSIS — I10 ESSENTIAL HYPERTENSION: Primary | ICD-10-CM

## 2019-04-15 DIAGNOSIS — Z98.890 S/P RIGHT ROTATOR CUFF REPAIR: ICD-10-CM

## 2019-04-15 PROCEDURE — G8417 CALC BMI ABV UP PARAM F/U: HCPCS | Performed by: PHYSICIAN ASSISTANT

## 2019-04-15 PROCEDURE — 3017F COLORECTAL CA SCREEN DOC REV: CPT | Performed by: PHYSICIAN ASSISTANT

## 2019-04-15 PROCEDURE — 1036F TOBACCO NON-USER: CPT | Performed by: PHYSICIAN ASSISTANT

## 2019-04-15 PROCEDURE — 99214 OFFICE O/P EST MOD 30 MIN: CPT | Performed by: PHYSICIAN ASSISTANT

## 2019-04-15 PROCEDURE — G8427 DOCREV CUR MEDS BY ELIG CLIN: HCPCS | Performed by: PHYSICIAN ASSISTANT

## 2019-04-15 RX ORDER — AMLODIPINE BESYLATE 10 MG/1
10 TABLET ORAL DAILY
Qty: 30 TABLET | Refills: 3 | Status: SHIPPED | OUTPATIENT
Start: 2019-04-15 | End: 2019-09-16

## 2019-04-15 NOTE — LETTER
[address] [place] [address]  Phone: [phone]  Fax: [phone]    Catalina Bourgeois        April 15, 2019     Patient: Maya Post   YOB: 1955   Date of Visit: 4/15/2019       To Whom It May Concern: It is my medical opinion that Aury Fernandez requires a disability parking placard for the following reasons:  He has limited walking ability due to an arthritic and an orthopedic condition. Duration of need: 3 years    If you have any questions or concerns, please don't hesitate to call.     Sincerely,              Kathy Aponte PA-C

## 2019-04-15 NOTE — PROGRESS NOTES
[facility]  [address]  Dept: [phone]  Dept Fax: [phone]    Office Progress/Follow Up Note  Date of patient's visit: 4/15/2019  Patient's Name:  Yayo Jain YOB: 1955            Patient Care Team:  Michele Rodriguez PA-C as PCP - General  ================================================================    REASON FOR VISIT/CHIEF COMPLAINT:  Hypertension and Diabetes    HISTORY OF PRESENTING ILLNESS:  History was obtained from: patient. Yayo Jain is a 58 y.o. is here for follow-up for status post right rotator cuff repair, high blood pressure. Patient states he is compliant with all medications. Patient is status post rotator cuff surgery on 3/22/19, tolerated procedure well, was seen by Ortho on 4/5/19, states will be following up on 5/7/19, patient states pain \"varies\". Patient is requesting handicap plaque or renewal here at patient blood pressure is elevated in office. Patient is 33% compliant with lisinopril, 17% compliant with amlodipine. Discuss uncontrolled high blood pressure, increased risk of MI or stroke occurrence, importance of medication compliance in depth with patient. Patient weight is stable. Patient requesting medication refills. Labs reviewed. Health maintenance reviewed. Medications reviewed, refilled amlodipine 10 mg. Informed patient there'll be no changes to medication for high blood pressure, continue current treatments.     HPI    Patient Active Problem List   Diagnosis    Chronic pain syndrome    DJD (degenerative joint disease), thoracic    Obesity    ED (erectile dysfunction)    Neuropathy    DJD (degenerative joint disease), lumbosacral    Anemia    Sleep disorder    Internal derangement of knee joint    Unstable gait    Type 2 diabetes mellitus without complication ([address]    Spondylosis of cervical region without myelopathy or shampoo ketoconazole 2 % shampoo      omeprazole (PRILOSEC) 20 MG delayed release capsule omeprazole 20 mg capsule,delayed release prn      tadalafil (CIALIS) 20 MG tablet Take 1 tablet by mouth as needed for Erectile Dysfunction 30 tablet 2     No current facility-administered medications for this visit. Social History     Tobacco Use    Smoking status: Former Smoker     Packs/day: 1.00     Years: 10.00     Pack years: 10.00     Last attempt to quit: 1991     Years since quittin.3    Smokeless tobacco: Never Used   Substance Use Topics    Alcohol use: No    Drug use: No       Family History   Problem Relation Age of Onset    Diabetes Mother     High Blood Pressure Mother     Heart Failure Mother     Heart Attack Father         REVIEW OFSYSTEMS:  Review of Systems   Constitutional: Negative for chills and fever. HENT: Negative for congestion. Respiratory: Negative for cough, shortness of breath and wheezing. Cardiovascular: Negative for chest pain. Gastrointestinal: Negative for constipation, diarrhea, nausea and vomiting. Genitourinary: Negative for dysuria, frequency and urgency. Musculoskeletal: Positive for arthralgias and myalgias. Negative for back pain and neck pain. Neurological: Negative for headaches. PHYSICAL EXAM:  Vitals:    04/15/19 1406 04/15/19 1456   BP: (!) 164/80 (!) 161/82   Site: Left Upper Arm Left Upper Arm   Position: Sitting Sitting   Cuff Size: Large Adult Medium Adult   Pulse: 81    Resp: 20    Temp: 97.7 °F (36.5 °C)    TempSrc: Oral    SpO2: 97%    Weight: 219 lb (99.3 kg)    Height: 6' (1.829 m)      BP Readings from Last 3 Encounters:   04/15/19 (!) 161/82   19 (!) 160/76   19 124/70        Physical Exam   Constitutional: He is oriented to person, place, and time. He appears well-developed and well-nourished. He is cooperative. HENT:   Head: Normocephalic and atraumatic.    Right Ear: External ear normal.   Left Ear: External

## 2019-04-20 ASSESSMENT — ENCOUNTER SYMPTOMS
DIARRHEA: 0
NAUSEA: 0
BACK PAIN: 0
SHORTNESS OF BREATH: 0
COUGH: 0
VOMITING: 0
CONSTIPATION: 0
WHEEZING: 0

## 2019-04-29 DIAGNOSIS — G47.9 SLEEP DISORDER: Primary | ICD-10-CM

## 2019-04-29 NOTE — TELEPHONE ENCOUNTER
Last visit: 04/15/2019  Last Med refill: 03/23/2019  Does patient have enough medication for 72 hours: Yes    Next Visit Date:  Future Appointments   Date Time Provider Joey Stacy   5/7/2019 11:15 AM Jacob Kim MD [place] [facility]   9/16/2019  5:00 PM Charly Price PA-C [facility] Maintenance   Topic Date Due    DTaP/Tdap/Td vaccine (1 - Tdap) 07/20/1973    Shingles Vaccine (1 of 2) 07/20/2004    Diabetic foot exam  10/30/2016    Colon cancer screen colonoscopy  01/01/2019    Diabetic retinal exam  03/30/2019    Flu vaccine (Season Ended) 09/01/2019    A1C test (Diabetic or Prediabetic)  10/27/2019    Diabetic microalbuminuria test  10/27/2019    Lipid screen  10/27/2019    Potassium monitoring  03/13/2020    Creatinine monitoring  03/13/2020    Pneumococcal 0-64 years Vaccine  Completed    Hepatitis C screen  Completed    HIV screen  Completed       Hemoglobin A1C (%)   Date Value   10/27/2018 5.6   10/21/2017 5.6   04/18/2017 5.7             ( goal A1C is < 7)   Microalb/Crt.  Ratio (mcg/mg creat)   Date Value   10/27/2018 CANNOT BE CALCULATED     LDL Cholesterol (mg/dL)   Date Value   10/27/2018 158 (H)   10/21/2017 118       (goal LDL is <100)   AST (U/L)   Date Value   10/27/2018 22     ALT (U/L)   Date Value   10/27/2018 24     BUN (mg/dL)   Date Value   03/13/2019 14     BP Readings from Last 3 Encounters:   04/15/19 (!) 161/82   03/22/19 (!) 160/76   03/22/19 124/70          (goal 120/80)    All Future Testing planned in [facility]  Lab Frequency Next Occurrence               Patient Active Problem List:     Chronic pain syndrome     DJD (degenerative joint disease), thoracic     Obesity     ED (erectile dysfunction)     Neuropathy     DJD (degenerative joint disease), lumbosacral     Anemia     Sleep disorder     Internal derangement of knee joint     Unstable gait     Type 2 diabetes mellitus without complication (HCC)     Spondylosis of cervical region without myelopathy or radiculopathy     Essential hypertension     Mixed hyperlipidemia     Complete tear of right rotator cuff     Obstructive rhinitis     Closed trimalleolar fracture of right ankle     Fatigue due to exposure     Gastroesophageal reflux disease     Tear of right supraspinatus tendon     Tear of infraspinatus tendon, right, initial encounter     Tendinosis of right shoulder

## 2019-04-30 RX ORDER — ZOLPIDEM TARTRATE 12.5 MG/1
12.5 TABLET, FILM COATED, EXTENDED RELEASE ORAL NIGHTLY PRN
Qty: 30 TABLET | Refills: 2 | Status: SHIPPED | OUTPATIENT
Start: 2019-04-30 | End: 2020-01-06 | Stop reason: SDUPTHER

## 2019-05-07 ENCOUNTER — OFFICE VISIT (OUTPATIENT)
Dept: ORTHOPEDIC SURGERY | Age: 64
End: 2019-05-07

## 2019-05-07 VITALS — BODY MASS INDEX: 29.39 KG/M2 | WEIGHT: 217 LBS | HEIGHT: 72 IN

## 2019-05-07 DIAGNOSIS — Z98.890 STATUS POST ROTATOR CUFF REPAIR: Primary | ICD-10-CM

## 2019-05-07 PROCEDURE — 99024 POSTOP FOLLOW-UP VISIT: CPT | Performed by: ORTHOPAEDIC SURGERY

## 2019-06-18 ENCOUNTER — OFFICE VISIT (OUTPATIENT)
Dept: ORTHOPEDIC SURGERY | Age: 64
End: 2019-06-18

## 2019-06-18 VITALS — HEIGHT: 72 IN | WEIGHT: 216.93 LBS | BODY MASS INDEX: 29.38 KG/M2

## 2019-06-18 DIAGNOSIS — Z98.890 STATUS POST ROTATOR CUFF REPAIR: Primary | ICD-10-CM

## 2019-06-18 PROCEDURE — 99024 POSTOP FOLLOW-UP VISIT: CPT | Performed by: ORTHOPAEDIC SURGERY

## 2019-06-18 NOTE — LETTER
[address]  [address] [facility]. [address]  [address] [facility] [address]  Phone: [phone]  Fax: [phone]    Leodan Ozuna MD        June 18, 2019     Patient: Waleska Christopher   YOB: 1955   Date of Visit: 6/18/2019       To Whom It May Concern: It is my medical opinion that Heidi Ramirez should remain out of work until 7/1/19. Then patient can return to work with the following restrictions: no pushing, pulling, or lifting greater than 2-3 lbs. No activity above or at shoulder lever. Will re-evaluate restrictions at next visit. If you have any questions or concerns, please don't hesitate to call.     Sincerely,        Leodan Ozuna MD

## 2019-06-20 NOTE — PROGRESS NOTES
Procedure: Right shoulder arthroscopic rotator cuff repair and biceps tenotomy  Date of procedure: 3/22/19    HPI:  Mayda Liz is a 58 y.o. male who is approximately 3 months status post aforementioned procedure. He states that he is doing a lot better but would like to be further along. Physical therapy is going well. He reports that he is weak with external rotation. Physical examination:  Evaluation of patient's right shoulder and upper extremity demonstrates his incisions to be appropriately healed. Sensation is grossly intact light touch in all dermatomes and he has a 2+ radial pulse with brisk capillary refill in his fingers. He has approximately 90 degrees of active shoulder elevation, 90 degrees of active abduction and external rotation to 40 degrees. Passively he has 115 degrees of elevation in the 110 degrees of abduction. Impression and plan:  Mayda Liz is a 58 y.o. male who is 3 months status post an arthroscopic right shoulder rotator cuff repair and biceps tenotomy. He continues to do relatively well at this time considering he had a massive rotator cuff tear. At this time we will have him continue on with physical therapy. He would like to return to work at this time. In discussing with him about his duties at his job he indicates that he really does not do any heavy lifting. 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 I am fine with him returning to work but placed him on a 2 to 3 pound weight lifting, pushing, and pulling restriction. He is also not to engage in any repetitive at or above shoulder activity. I like to see him back for reevaluation in 3 months but he was encouraged to return or call earlier with any questions and/or concerns.

## 2019-09-16 ENCOUNTER — OFFICE VISIT (OUTPATIENT)
Dept: PRIMARY CARE CLINIC | Age: 64
End: 2019-09-16
Payer: COMMERCIAL

## 2019-09-16 VITALS
RESPIRATION RATE: 20 BRPM | WEIGHT: 222 LBS | HEART RATE: 77 BPM | BODY MASS INDEX: 30.07 KG/M2 | DIASTOLIC BLOOD PRESSURE: 91 MMHG | OXYGEN SATURATION: 96 % | HEIGHT: 72 IN | SYSTOLIC BLOOD PRESSURE: 181 MMHG

## 2019-09-16 DIAGNOSIS — E78.2 MIXED HYPERLIPIDEMIA: ICD-10-CM

## 2019-09-16 DIAGNOSIS — E66.3 OVERWEIGHT (BMI 25.0-29.9): ICD-10-CM

## 2019-09-16 DIAGNOSIS — Z12.12 SCREENING FOR COLORECTAL CANCER: ICD-10-CM

## 2019-09-16 DIAGNOSIS — Z00.00 HEALTHCARE MAINTENANCE: ICD-10-CM

## 2019-09-16 DIAGNOSIS — Z12.11 SCREENING FOR COLORECTAL CANCER: ICD-10-CM

## 2019-09-16 DIAGNOSIS — I10 ESSENTIAL HYPERTENSION: Primary | ICD-10-CM

## 2019-09-16 PROCEDURE — 99397 PER PM REEVAL EST PAT 65+ YR: CPT | Performed by: PHYSICIAN ASSISTANT

## 2019-09-16 RX ORDER — SIMVASTATIN 80 MG
80 TABLET ORAL NIGHTLY
Qty: 90 TABLET | Refills: 1 | Status: SHIPPED | OUTPATIENT
Start: 2019-09-16

## 2019-09-16 RX ORDER — LISINOPRIL 40 MG/1
40 TABLET ORAL DAILY
Qty: 30 TABLET | Refills: 2 | Status: SHIPPED | OUTPATIENT
Start: 2019-09-16 | End: 2020-01-06 | Stop reason: SDUPTHER

## 2019-09-16 RX ORDER — NIFEDIPINE 30 MG/1
30 TABLET, FILM COATED, EXTENDED RELEASE ORAL DAILY
Qty: 30 TABLET | Refills: 1 | Status: SHIPPED | OUTPATIENT
Start: 2019-09-16 | End: 2019-10-08 | Stop reason: SDUPTHER

## 2019-09-17 ENCOUNTER — TELEPHONE (OUTPATIENT)
Dept: GASTROENTEROLOGY | Age: 64
End: 2019-09-17

## 2019-09-17 NOTE — TELEPHONE ENCOUNTER
Spoke with patient regarding referral for Colonoscopy, patient is at work and will call back when he is free.   1st attempt

## 2019-09-30 ENCOUNTER — TELEPHONE (OUTPATIENT)
Dept: PRIMARY CARE CLINIC | Age: 64
End: 2019-09-30

## 2019-10-01 ENCOUNTER — OFFICE VISIT (OUTPATIENT)
Dept: PRIMARY CARE CLINIC | Age: 64
End: 2019-10-01
Payer: COMMERCIAL

## 2019-10-01 VITALS
BODY MASS INDEX: 29.29 KG/M2 | OXYGEN SATURATION: 98 % | SYSTOLIC BLOOD PRESSURE: 195 MMHG | WEIGHT: 216 LBS | DIASTOLIC BLOOD PRESSURE: 102 MMHG | HEART RATE: 80 BPM

## 2019-10-01 DIAGNOSIS — J01.91 ACUTE RECURRENT SINUSITIS, UNSPECIFIED LOCATION: Primary | ICD-10-CM

## 2019-10-01 PROCEDURE — 1036F TOBACCO NON-USER: CPT | Performed by: INTERNAL MEDICINE

## 2019-10-01 PROCEDURE — 3017F COLORECTAL CA SCREEN DOC REV: CPT | Performed by: INTERNAL MEDICINE

## 2019-10-01 PROCEDURE — 4040F PNEUMOC VAC/ADMIN/RCVD: CPT | Performed by: INTERNAL MEDICINE

## 2019-10-01 PROCEDURE — 99213 OFFICE O/P EST LOW 20 MIN: CPT | Performed by: INTERNAL MEDICINE

## 2019-10-01 PROCEDURE — G8484 FLU IMMUNIZE NO ADMIN: HCPCS | Performed by: INTERNAL MEDICINE

## 2019-10-01 PROCEDURE — 1123F ACP DISCUSS/DSCN MKR DOCD: CPT | Performed by: INTERNAL MEDICINE

## 2019-10-01 PROCEDURE — G8427 DOCREV CUR MEDS BY ELIG CLIN: HCPCS | Performed by: INTERNAL MEDICINE

## 2019-10-01 PROCEDURE — G8417 CALC BMI ABV UP PARAM F/U: HCPCS | Performed by: INTERNAL MEDICINE

## 2019-10-01 RX ORDER — AMOXICILLIN AND CLAVULANATE POTASSIUM 875; 125 MG/1; MG/1
1 TABLET, FILM COATED ORAL 2 TIMES DAILY
Qty: 14 TABLET | Refills: 0 | Status: SHIPPED | OUTPATIENT
Start: 2019-10-01 | End: 2019-10-08

## 2019-10-01 ASSESSMENT — ENCOUNTER SYMPTOMS
SINUS COMPLAINT: 1
SINUS PRESSURE: 1

## 2019-10-04 ASSESSMENT — ENCOUNTER SYMPTOMS
BACK PAIN: 0
ABDOMINAL PAIN: 0
NAUSEA: 0
VOMITING: 0
EYE PAIN: 0
DIARRHEA: 0
CONSTIPATION: 0
COUGH: 0
RHINORRHEA: 0
SORE THROAT: 0
SHORTNESS OF BREATH: 0

## 2019-10-08 ENCOUNTER — OFFICE VISIT (OUTPATIENT)
Dept: ORTHOPEDIC SURGERY | Age: 64
End: 2019-10-08
Payer: COMMERCIAL

## 2019-10-08 VITALS — HEIGHT: 72 IN | WEIGHT: 216 LBS | BODY MASS INDEX: 29.26 KG/M2

## 2019-10-08 DIAGNOSIS — Z98.890 STATUS POST ROTATOR CUFF REPAIR: Primary | ICD-10-CM

## 2019-10-08 PROCEDURE — 1123F ACP DISCUSS/DSCN MKR DOCD: CPT | Performed by: ORTHOPAEDIC SURGERY

## 2019-10-08 PROCEDURE — 1036F TOBACCO NON-USER: CPT | Performed by: ORTHOPAEDIC SURGERY

## 2019-10-08 PROCEDURE — 4040F PNEUMOC VAC/ADMIN/RCVD: CPT | Performed by: ORTHOPAEDIC SURGERY

## 2019-10-08 PROCEDURE — 3017F COLORECTAL CA SCREEN DOC REV: CPT | Performed by: ORTHOPAEDIC SURGERY

## 2019-10-08 PROCEDURE — G8484 FLU IMMUNIZE NO ADMIN: HCPCS | Performed by: ORTHOPAEDIC SURGERY

## 2019-10-08 PROCEDURE — G8427 DOCREV CUR MEDS BY ELIG CLIN: HCPCS | Performed by: ORTHOPAEDIC SURGERY

## 2019-10-08 PROCEDURE — 99213 OFFICE O/P EST LOW 20 MIN: CPT | Performed by: ORTHOPAEDIC SURGERY

## 2019-10-08 PROCEDURE — G8417 CALC BMI ABV UP PARAM F/U: HCPCS | Performed by: ORTHOPAEDIC SURGERY

## 2019-10-09 ENCOUNTER — HOSPITAL ENCOUNTER (OUTPATIENT)
Age: 64
Discharge: HOME | End: 2019-10-09
Payer: COMMERCIAL

## 2019-10-09 DIAGNOSIS — Z00.00 HEALTHCARE MAINTENANCE: ICD-10-CM

## 2019-10-09 DIAGNOSIS — E78.2 MIXED HYPERLIPIDEMIA: ICD-10-CM

## 2019-10-09 LAB
ALBUMIN SERPL-MCNC: 4.3 G/DL (ref 3.5–5.2)
ALBUMIN/GLOBULIN RATIO: 1.5 (ref 1–2.5)
ALP BLD-CCNC: 86 U/L (ref 40–129)
ALT SERPL-CCNC: 20 U/L (ref 5–41)
AST SERPL-CCNC: 18 U/L
BILIRUB SERPL-MCNC: 0.82 MG/DL (ref 0.3–1.2)
BILIRUBIN DIRECT: 0.15 MG/DL
BILIRUBIN, INDIRECT: 0.67 MG/DL (ref 0–1)
CHOLESTEROL/HDL RATIO: 4.3
CHOLESTEROL: 229 MG/DL
ESTIMATED AVERAGE GLUCOSE: 120 MG/DL
GLOBULIN: NORMAL G/DL (ref 1.5–3.8)
HBA1C MFR BLD: 5.8 % (ref 4–6)
HDLC SERPL-MCNC: 53 MG/DL
LDL CHOLESTEROL: 157 MG/DL (ref 0–130)
PROSTATE SPECIFIC ANTIGEN: 2.84 UG/L
TOTAL PROTEIN: 7.2 G/DL (ref 6.4–8.3)
TRIGL SERPL-MCNC: 95 MG/DL
VLDLC SERPL CALC-MCNC: ABNORMAL MG/DL (ref 1–30)

## 2019-10-09 PROCEDURE — 80061 LIPID PANEL: CPT

## 2019-10-09 PROCEDURE — 83036 HEMOGLOBIN GLYCOSYLATED A1C: CPT

## 2019-10-09 PROCEDURE — G0103 PSA SCREENING: HCPCS

## 2019-10-09 PROCEDURE — 80076 HEPATIC FUNCTION PANEL: CPT

## 2019-10-09 PROCEDURE — 36415 COLL VENOUS BLD VENIPUNCTURE: CPT

## 2019-10-23 DIAGNOSIS — I10 ESSENTIAL HYPERTENSION: ICD-10-CM

## 2019-10-23 RX ORDER — NIFEDIPINE 30 MG/1
TABLET, FILM COATED, EXTENDED RELEASE ORAL
Qty: 90 TABLET | Refills: 0 | Status: SHIPPED | OUTPATIENT
Start: 2019-10-23 | End: 2020-01-06 | Stop reason: SDUPTHER

## 2019-11-01 DIAGNOSIS — Z12.11 SCREENING FOR COLON CANCER: Primary | ICD-10-CM

## 2019-11-01 RX ORDER — POLYETHYLENE GLYCOL 3350 17 G/17G
POWDER, FOR SOLUTION ORAL
Qty: 255 G | Refills: 0 | Status: SHIPPED | OUTPATIENT
Start: 2019-11-01 | End: 2019-12-01

## 2019-11-01 NOTE — TELEPHONE ENCOUNTER
Ismael Zambrano called this afternoon regarding the referral to schedule colonoscopy, we completed NP Questionnaire, last colon 10 years ago, no findings  WE have scheduled for [facility] with Dr Kishor Kim. Ahamd Monday 12/2/2019 @ 8:30am w/arrival at 7:00am  Patients bowel prep will be se to the pharmacy and patient requested we email the prep instructions to [email or web address]

## 2019-11-26 ENCOUNTER — TELEPHONE (OUTPATIENT)
Dept: GASTROENTEROLOGY | Age: 64
End: 2019-11-26

## 2019-11-29 ENCOUNTER — OFFICE VISIT (OUTPATIENT)
Dept: PRIMARY CARE CLINIC | Age: 64
End: 2019-11-29
Payer: COMMERCIAL

## 2019-11-29 VITALS
SYSTOLIC BLOOD PRESSURE: 204 MMHG | BODY MASS INDEX: 29.57 KG/M2 | HEART RATE: 92 BPM | WEIGHT: 218 LBS | TEMPERATURE: 97.2 F | DIASTOLIC BLOOD PRESSURE: 94 MMHG

## 2019-11-29 DIAGNOSIS — H60.502 ACUTE OTITIS EXTERNA OF LEFT EAR, UNSPECIFIED TYPE: ICD-10-CM

## 2019-11-29 DIAGNOSIS — J01.00 ACUTE NON-RECURRENT MAXILLARY SINUSITIS: Primary | ICD-10-CM

## 2019-11-29 PROCEDURE — 1123F ACP DISCUSS/DSCN MKR DOCD: CPT | Performed by: NURSE PRACTITIONER

## 2019-11-29 PROCEDURE — 3017F COLORECTAL CA SCREEN DOC REV: CPT | Performed by: NURSE PRACTITIONER

## 2019-11-29 PROCEDURE — 4040F PNEUMOC VAC/ADMIN/RCVD: CPT | Performed by: NURSE PRACTITIONER

## 2019-11-29 PROCEDURE — 1036F TOBACCO NON-USER: CPT | Performed by: NURSE PRACTITIONER

## 2019-11-29 PROCEDURE — G8417 CALC BMI ABV UP PARAM F/U: HCPCS | Performed by: NURSE PRACTITIONER

## 2019-11-29 PROCEDURE — 99213 OFFICE O/P EST LOW 20 MIN: CPT | Performed by: NURSE PRACTITIONER

## 2019-11-29 PROCEDURE — G8427 DOCREV CUR MEDS BY ELIG CLIN: HCPCS | Performed by: NURSE PRACTITIONER

## 2019-11-29 PROCEDURE — G8484 FLU IMMUNIZE NO ADMIN: HCPCS | Performed by: NURSE PRACTITIONER

## 2019-11-29 PROCEDURE — 4130F TOPICAL PREP RX AOE: CPT | Performed by: NURSE PRACTITIONER

## 2019-11-29 RX ORDER — CIPROFLOXACIN AND DEXAMETHASONE 3; 1 MG/ML; MG/ML
4 SUSPENSION/ DROPS AURICULAR (OTIC) 2 TIMES DAILY
Qty: 7.5 ML | Refills: 0 | Status: SHIPPED | OUTPATIENT
Start: 2019-11-29 | End: 2019-12-06

## 2019-11-29 RX ORDER — AMOXICILLIN AND CLAVULANATE POTASSIUM 875; 125 MG/1; MG/1
1 TABLET, FILM COATED ORAL 2 TIMES DAILY
Qty: 20 TABLET | Refills: 0 | Status: SHIPPED | OUTPATIENT
Start: 2019-11-29 | End: 2019-12-09

## 2019-11-29 ASSESSMENT — ENCOUNTER SYMPTOMS
BACK PAIN: 0
VOMITING: 0
DIARRHEA: 0
NAUSEA: 0
ABDOMINAL PAIN: 0
SORE THROAT: 0
RHINORRHEA: 1
COUGH: 1
SINUS PAIN: 1
SHORTNESS OF BREATH: 0
EYE PAIN: 0

## 2019-12-02 ENCOUNTER — HOSPITAL ENCOUNTER (OUTPATIENT)
Age: 64
Setting detail: OUTPATIENT SURGERY
Discharge: HOME | End: 2019-12-02
Attending: INTERNAL MEDICINE | Admitting: INTERNAL MEDICINE
Payer: COMMERCIAL

## 2019-12-02 ENCOUNTER — ANESTHESIA (OUTPATIENT)
Dept: OPERATING ROOM | Age: 64
End: 2019-12-02
Payer: COMMERCIAL

## 2019-12-02 ENCOUNTER — ANESTHESIA EVENT (OUTPATIENT)
Dept: OPERATING ROOM | Age: 64
End: 2019-12-02
Payer: COMMERCIAL

## 2019-12-02 VITALS
RESPIRATION RATE: 18 BRPM | OXYGEN SATURATION: 98 % | HEART RATE: 73 BPM | DIASTOLIC BLOOD PRESSURE: 87 MMHG | SYSTOLIC BLOOD PRESSURE: 171 MMHG | BODY MASS INDEX: 28.65 KG/M2 | HEIGHT: 72 IN | TEMPERATURE: 97.2 F | WEIGHT: 211.5 LBS

## 2019-12-02 VITALS
OXYGEN SATURATION: 99 % | DIASTOLIC BLOOD PRESSURE: 72 MMHG | RESPIRATION RATE: 12 BRPM | SYSTOLIC BLOOD PRESSURE: 152 MMHG

## 2019-12-02 LAB
GLUCOSE BLD-MCNC: 107 MG/DL (ref 75–110)
GLUCOSE BLD-MCNC: 116 MG/DL (ref 75–110)

## 2019-12-02 PROCEDURE — 7100000011 HC PHASE II RECOVERY - ADDTL 15 MIN: Performed by: INTERNAL MEDICINE

## 2019-12-02 PROCEDURE — 3700000001 HC ADD 15 MINUTES (ANESTHESIA): Performed by: INTERNAL MEDICINE

## 2019-12-02 PROCEDURE — 7100000010 HC PHASE II RECOVERY - FIRST 15 MIN: Performed by: INTERNAL MEDICINE

## 2019-12-02 PROCEDURE — 88305 TISSUE EXAM BY PATHOLOGIST: CPT

## 2019-12-02 PROCEDURE — 6360000002 HC RX W HCPCS: Performed by: NURSE ANESTHETIST, CERTIFIED REGISTERED

## 2019-12-02 PROCEDURE — 3700000000 HC ANESTHESIA ATTENDED CARE: Performed by: INTERNAL MEDICINE

## 2019-12-02 PROCEDURE — 45380 COLONOSCOPY AND BIOPSY: CPT | Performed by: INTERNAL MEDICINE

## 2019-12-02 PROCEDURE — 2500000003 HC RX 250 WO HCPCS: Performed by: NURSE ANESTHETIST, CERTIFIED REGISTERED

## 2019-12-02 PROCEDURE — 2580000003 HC RX 258: Performed by: ANESTHESIOLOGY

## 2019-12-02 PROCEDURE — 3609010300 HC COLONOSCOPY W/BIOPSY SINGLE/MULTIPLE: Performed by: INTERNAL MEDICINE

## 2019-12-02 PROCEDURE — 2709999900 HC NON-CHARGEABLE SUPPLY: Performed by: INTERNAL MEDICINE

## 2019-12-02 PROCEDURE — 82947 ASSAY GLUCOSE BLOOD QUANT: CPT

## 2019-12-02 RX ORDER — FENTANYL CITRATE 50 UG/ML
50 INJECTION, SOLUTION INTRAMUSCULAR; INTRAVENOUS EVERY 5 MIN PRN
Status: DISCONTINUED | OUTPATIENT
Start: 2019-12-02 | End: 2019-12-02 | Stop reason: HOSPADM

## 2019-12-02 RX ORDER — SODIUM CHLORIDE 0.9 % (FLUSH) 0.9 %
10 SYRINGE (ML) INJECTION EVERY 12 HOURS SCHEDULED
Status: DISCONTINUED | OUTPATIENT
Start: 2019-12-02 | End: 2019-12-02 | Stop reason: HOSPADM

## 2019-12-02 RX ORDER — SODIUM CHLORIDE, SODIUM LACTATE, POTASSIUM CHLORIDE, CALCIUM CHLORIDE 600; 310; 30; 20 MG/100ML; MG/100ML; MG/100ML; MG/100ML
INJECTION, SOLUTION INTRAVENOUS CONTINUOUS
Status: DISCONTINUED | OUTPATIENT
Start: 2019-12-02 | End: 2019-12-02 | Stop reason: HOSPADM

## 2019-12-02 RX ORDER — PROPOFOL 10 MG/ML
INJECTION, EMULSION INTRAVENOUS PRN
Status: DISCONTINUED | OUTPATIENT
Start: 2019-12-02 | End: 2019-12-02 | Stop reason: SDUPTHER

## 2019-12-02 RX ORDER — SODIUM CHLORIDE 0.9 % (FLUSH) 0.9 %
10 SYRINGE (ML) INJECTION PRN
Status: DISCONTINUED | OUTPATIENT
Start: 2019-12-02 | End: 2019-12-02 | Stop reason: HOSPADM

## 2019-12-02 RX ORDER — FENTANYL CITRATE 50 UG/ML
25 INJECTION, SOLUTION INTRAMUSCULAR; INTRAVENOUS EVERY 5 MIN PRN
Status: DISCONTINUED | OUTPATIENT
Start: 2019-12-02 | End: 2019-12-02 | Stop reason: HOSPADM

## 2019-12-02 RX ORDER — LIDOCAINE HYDROCHLORIDE 20 MG/ML
INJECTION, SOLUTION EPIDURAL; INFILTRATION; INTRACAUDAL; PERINEURAL PRN
Status: DISCONTINUED | OUTPATIENT
Start: 2019-12-02 | End: 2019-12-02 | Stop reason: SDUPTHER

## 2019-12-02 RX ORDER — LIDOCAINE HYDROCHLORIDE 10 MG/ML
1 INJECTION, SOLUTION EPIDURAL; INFILTRATION; INTRACAUDAL; PERINEURAL
Status: DISCONTINUED | OUTPATIENT
Start: 2019-12-02 | End: 2019-12-02 | Stop reason: HOSPADM

## 2019-12-02 RX ORDER — ONDANSETRON 2 MG/ML
4 INJECTION INTRAMUSCULAR; INTRAVENOUS
Status: DISCONTINUED | OUTPATIENT
Start: 2019-12-02 | End: 2019-12-02 | Stop reason: HOSPADM

## 2019-12-02 RX ORDER — SODIUM CHLORIDE 9 MG/ML
INJECTION, SOLUTION INTRAVENOUS CONTINUOUS
Status: DISCONTINUED | OUTPATIENT
Start: 2019-12-02 | End: 2019-12-02

## 2019-12-02 RX ADMIN — PROPOFOL 50 MG: 10 INJECTION, EMULSION INTRAVENOUS at 09:11

## 2019-12-02 RX ADMIN — LIDOCAINE HYDROCHLORIDE 50 MG: 20 INJECTION, SOLUTION EPIDURAL; INFILTRATION; INTRACAUDAL; PERINEURAL at 09:03

## 2019-12-02 RX ADMIN — PROPOFOL 30 MG: 10 INJECTION, EMULSION INTRAVENOUS at 09:13

## 2019-12-02 RX ADMIN — PROPOFOL 50 MG: 10 INJECTION, EMULSION INTRAVENOUS at 09:04

## 2019-12-02 RX ADMIN — SODIUM CHLORIDE, POTASSIUM CHLORIDE, SODIUM LACTATE AND CALCIUM CHLORIDE: 600; 310; 30; 20 INJECTION, SOLUTION INTRAVENOUS at 08:27

## 2019-12-02 RX ADMIN — PROPOFOL 50 MG: 10 INJECTION, EMULSION INTRAVENOUS at 09:07

## 2019-12-02 RX ADMIN — PROPOFOL 50 MG: 10 INJECTION, EMULSION INTRAVENOUS at 09:03

## 2019-12-02 ASSESSMENT — PULMONARY FUNCTION TESTS
PIF_VALUE: 1

## 2019-12-02 ASSESSMENT — PAIN SCALES - GENERAL: PAINLEVEL_OUTOF10: 0

## 2019-12-02 ASSESSMENT — PAIN - FUNCTIONAL ASSESSMENT: PAIN_FUNCTIONAL_ASSESSMENT: 0-10

## 2019-12-03 LAB — SURGICAL PATHOLOGY REPORT: NORMAL

## 2019-12-12 ENCOUNTER — TELEPHONE (OUTPATIENT)
Dept: GASTROENTEROLOGY | Age: 64
End: 2019-12-12

## 2020-01-06 ENCOUNTER — OFFICE VISIT (OUTPATIENT)
Dept: PRIMARY CARE CLINIC | Age: 65
End: 2020-01-06
Payer: COMMERCIAL

## 2020-01-06 VITALS
WEIGHT: 220 LBS | SYSTOLIC BLOOD PRESSURE: 197 MMHG | BODY MASS INDEX: 29.84 KG/M2 | TEMPERATURE: 98.1 F | DIASTOLIC BLOOD PRESSURE: 85 MMHG | HEART RATE: 70 BPM | OXYGEN SATURATION: 98 %

## 2020-01-06 LAB — HBA1C MFR BLD: 5.7 %

## 2020-01-06 PROCEDURE — G8482 FLU IMMUNIZE ORDER/ADMIN: HCPCS | Performed by: PHYSICIAN ASSISTANT

## 2020-01-06 PROCEDURE — 3017F COLORECTAL CA SCREEN DOC REV: CPT | Performed by: PHYSICIAN ASSISTANT

## 2020-01-06 PROCEDURE — 83036 HEMOGLOBIN GLYCOSYLATED A1C: CPT | Performed by: PHYSICIAN ASSISTANT

## 2020-01-06 PROCEDURE — G8427 DOCREV CUR MEDS BY ELIG CLIN: HCPCS | Performed by: PHYSICIAN ASSISTANT

## 2020-01-06 PROCEDURE — G8417 CALC BMI ABV UP PARAM F/U: HCPCS | Performed by: PHYSICIAN ASSISTANT

## 2020-01-06 PROCEDURE — 4040F PNEUMOC VAC/ADMIN/RCVD: CPT | Performed by: PHYSICIAN ASSISTANT

## 2020-01-06 PROCEDURE — 1123F ACP DISCUSS/DSCN MKR DOCD: CPT | Performed by: PHYSICIAN ASSISTANT

## 2020-01-06 PROCEDURE — 2022F DILAT RTA XM EVC RTNOPTHY: CPT | Performed by: PHYSICIAN ASSISTANT

## 2020-01-06 PROCEDURE — 1036F TOBACCO NON-USER: CPT | Performed by: PHYSICIAN ASSISTANT

## 2020-01-06 PROCEDURE — 3044F HG A1C LEVEL LT 7.0%: CPT | Performed by: PHYSICIAN ASSISTANT

## 2020-01-06 PROCEDURE — 99214 OFFICE O/P EST MOD 30 MIN: CPT | Performed by: PHYSICIAN ASSISTANT

## 2020-01-06 RX ORDER — ZOLPIDEM TARTRATE 12.5 MG/1
12.5 TABLET, FILM COATED, EXTENDED RELEASE ORAL NIGHTLY PRN
Qty: 30 TABLET | Refills: 2 | Status: SHIPPED | OUTPATIENT
Start: 2020-01-06 | End: 2021-01-26 | Stop reason: SDUPTHER

## 2020-01-06 RX ORDER — LISINOPRIL 40 MG/1
40 TABLET ORAL DAILY
Qty: 30 TABLET | Refills: 2 | Status: SHIPPED | OUTPATIENT
Start: 2020-01-06 | End: 2020-04-13

## 2020-01-06 RX ORDER — SILDENAFIL 50 MG/1
25 TABLET, FILM COATED ORAL PRN
Qty: 24 TABLET | Refills: 0 | Status: SHIPPED | OUTPATIENT
Start: 2020-01-06 | End: 2021-01-26 | Stop reason: SDUPTHER

## 2020-01-06 RX ORDER — NIFEDIPINE 30 MG/1
TABLET, FILM COATED, EXTENDED RELEASE ORAL
Qty: 90 TABLET | Refills: 0 | Status: SHIPPED | OUTPATIENT
Start: 2020-01-06 | End: 2021-01-26 | Stop reason: SDUPTHER

## 2020-01-06 ASSESSMENT — ENCOUNTER SYMPTOMS: BACK PAIN: 1

## 2020-01-06 ASSESSMENT — PATIENT HEALTH QUESTIONNAIRE - PHQ9
SUM OF ALL RESPONSES TO PHQ QUESTIONS 1-9: 0
1. LITTLE INTEREST OR PLEASURE IN DOING THINGS: 0
SUM OF ALL RESPONSES TO PHQ QUESTIONS 1-9: 0
2. FEELING DOWN, DEPRESSED OR HOPELESS: 0
SUM OF ALL RESPONSES TO PHQ9 QUESTIONS 1 & 2: 0

## 2020-01-06 NOTE — PROGRESS NOTES
197/85   12/02/19 (!) 171/87   12/02/19 (!) 152/72        Physical Exam  Vitals signs reviewed. Constitutional:       Appearance: Normal appearance. He is well-developed, well-groomed and overweight. HENT:      Head: Normocephalic and atraumatic. Right Ear: External ear normal.      Left Ear: External ear normal.      Nose: Nose normal.   Eyes:      General: Lids are normal.      Conjunctiva/sclera: Conjunctivae normal.      Pupils: Pupils are equal, round, and reactive to light. Cardiovascular:      Rate and Rhythm: Normal rate and regular rhythm. Heart sounds: Normal heart sounds. Pulmonary:      Effort: Pulmonary effort is normal.      Breath sounds: Normal breath sounds. Abdominal:      Palpations: Abdomen is soft. There is no mass. Tenderness: There is no tenderness. Musculoskeletal:         General: No tenderness. Skin:     General: Skin is warm and dry. Neurological:      Mental Status: He is alert and oriented to person, place, and time. Psychiatric:         Behavior: Behavior is cooperative. DIAGNOSTIC FINDINGS:  CBC:  Lab Results   Component Value Date    WBC 6.4 03/13/2019    HGB 12.8 03/13/2019     03/13/2019     12/23/2011       BMP:    Lab Results   Component Value Date     03/13/2019    K 4.8 03/13/2019     03/13/2019    CO2 27 03/13/2019    BUN 14 03/13/2019    CREATININE 0.99 03/13/2019    GLUCOSE 110 03/13/2019    GLUCOSE 107 12/23/2011       HEMOGLOBIN A1C:   Lab Results   Component Value Date    LABA1C 5.7 01/06/2020       FASTING LIPID PANEL:  Lab Results   Component Value Date    CHOL 229 (H) 10/09/2019    HDL 53 10/09/2019    TRIG 95 10/09/2019       ASSESSMENT AND PLAN:  Salvatore Sotelo was seen today for hypertension and discuss labs. Diagnoses and all orders for this visit:    Essential hypertension  -     lisinopril (PRINIVIL;ZESTRIL) 40 MG tablet;  Take 1 tablet by mouth daily  -     NIFEdipine (ADALAT CC) 30 MG extended release tablet; TAKE 1 TABLET BY MOUTH EVERY DAY    Erectile dysfunction, unspecified erectile dysfunction type  -     sildenafil (VIAGRA) 50 MG tablet; Take 0.5 tablets by mouth as needed for Erectile Dysfunction    Type 2 diabetes mellitus without complication, without long-term current use of insulin (HCC)    Overweight (BMI 25.0-29. 9)    Healthcare maintenance  -     POCT glycosylated hemoglobin (Hb A1C)    Medication refill  -     zolpidem (AMBIEN CR) 12.5 MG extended release tablet; Take 1 tablet by mouth nightly as needed for Sleep for up to 90 days. FOLLOW UP AND INSTRUCTIONS:  Return in about 4 months (around 5/6/2020) for HTN. · Discussed use, benefit, and side effects of prescribed medications. Barriers to medication compliance addressed. All patient questions answered. Pt voiced understanding. · Patient given educational materials - see patient instructions    Electronically signed by Meghan Bell PA-C on 1/6/20 at 4:29 PM    This note is created with the assistance of a speech-recognition program. While intendingto generate a document that actually reflects the content of the visit, the document can still have some mistakes which may not have been identified and corrected by editing.

## 2020-01-08 ASSESSMENT — ENCOUNTER SYMPTOMS
DIARRHEA: 0
CONSTIPATION: 0
NAUSEA: 0
VOMITING: 0
WHEEZING: 0
SHORTNESS OF BREATH: 0
COUGH: 0

## 2020-01-10 PROBLEM — K63.5 COLON POLYP: Status: ACTIVE | Noted: 2019-12-02

## 2020-01-20 NOTE — TELEPHONE ENCOUNTER
Health Maintenance   Topic Date Due    DTaP/Tdap/Td vaccine (1 - Tdap) 07/20/1965    Shingles Vaccine (1 of 2) 07/20/2004    Diabetic foot exam  10/30/2016    Diabetic retinal exam  03/30/2019    Diabetic microalbuminuria test  10/27/2019    Potassium monitoring  03/13/2020    Creatinine monitoring  03/13/2020    Lipid screen  10/09/2020    A1C test (Diabetic or Prediabetic)  01/06/2021    Pneumococcal 65+ years Vaccine (1 of 1 - PPSV23) 04/18/2022    Colon cancer screen colonoscopy  12/02/2029    Flu vaccine  Completed    AAA screen  Completed    Hepatitis C screen  Completed    HIV screen  Completed             (applicable per patient's age: Cancer Screenings, Depression Screening, Fall Risk Screening, Immunizations)    Hemoglobin A1C (%)   Date Value   01/06/2020 5.7   10/09/2019 5.8   10/27/2018 5.6     Microalb/Crt.  Ratio (mcg/mg creat)   Date Value   10/27/2018 CANNOT BE CALCULATED     LDL Cholesterol (mg/dL)   Date Value   10/09/2019 157 (H)     AST (U/L)   Date Value   10/09/2019 18     ALT (U/L)   Date Value   10/09/2019 20     BUN (mg/dL)   Date Value   03/13/2019 14      (goal A1C is < 7)   (goal LDL is <100) need 30-50% reduction from baseline     BP Readings from Last 3 Encounters:   01/06/20 (!) 197/85   12/02/19 (!) 171/87   12/02/19 (!) 152/72    (goal /80)      All Future Testing planned in [facility]:  Lab Frequency Next Occurrence       Next Visit Date:  Future Appointments   Date Time Provider Joey Kumar   2/12/2020  3:45 PM Guanakito Whalen MD [facility]            Patient Active Problem List:     Chronic pain syndrome     DJD (degenerative joint disease), thoracic     Obesity     ED (erectile dysfunction)     Neuropathy     DJD (degenerative joint disease), lumbosacral     Anemia     Sleep disorder     Internal derangement of knee joint     Unstable gait     Type 2 diabetes mellitus without complication ([facility] [address]     Spondylosis of cervical region without myelopathy

## 2020-02-11 ENCOUNTER — TELEPHONE (OUTPATIENT)
Dept: GASTROENTEROLOGY | Age: 65
End: 2020-02-11

## 2020-02-12 ENCOUNTER — TELEPHONE (OUTPATIENT)
Dept: GASTROENTEROLOGY | Age: 65
End: 2020-02-12

## 2020-02-12 ENCOUNTER — OFFICE VISIT (OUTPATIENT)
Dept: GASTROENTEROLOGY | Age: 65
End: 2020-02-12
Payer: COMMERCIAL

## 2020-02-12 VITALS
DIASTOLIC BLOOD PRESSURE: 90 MMHG | SYSTOLIC BLOOD PRESSURE: 176 MMHG | WEIGHT: 217.9 LBS | BODY MASS INDEX: 29.55 KG/M2 | HEART RATE: 70 BPM

## 2020-02-12 PROCEDURE — G8427 DOCREV CUR MEDS BY ELIG CLIN: HCPCS | Performed by: INTERNAL MEDICINE

## 2020-02-12 PROCEDURE — 3017F COLORECTAL CA SCREEN DOC REV: CPT | Performed by: INTERNAL MEDICINE

## 2020-02-12 PROCEDURE — 99214 OFFICE O/P EST MOD 30 MIN: CPT | Performed by: INTERNAL MEDICINE

## 2020-02-12 PROCEDURE — 4040F PNEUMOC VAC/ADMIN/RCVD: CPT | Performed by: INTERNAL MEDICINE

## 2020-02-12 PROCEDURE — G8417 CALC BMI ABV UP PARAM F/U: HCPCS | Performed by: INTERNAL MEDICINE

## 2020-02-12 PROCEDURE — 1123F ACP DISCUSS/DSCN MKR DOCD: CPT | Performed by: INTERNAL MEDICINE

## 2020-02-12 PROCEDURE — 1036F TOBACCO NON-USER: CPT | Performed by: INTERNAL MEDICINE

## 2020-02-12 PROCEDURE — G8482 FLU IMMUNIZE ORDER/ADMIN: HCPCS | Performed by: INTERNAL MEDICINE

## 2020-02-12 ASSESSMENT — ENCOUNTER SYMPTOMS
COUGH: 0
CHOKING: 0
CONSTIPATION: 0
BACK PAIN: 1
ABDOMINAL DISTENTION: 0
TROUBLE SWALLOWING: 0
RECTAL PAIN: 0
WHEEZING: 0
DIARRHEA: 0
NAUSEA: 0
ABDOMINAL PAIN: 0
VOMITING: 0
SINUS PRESSURE: 0
BLOOD IN STOOL: 0
ANAL BLEEDING: 0
SORE THROAT: 0
VOICE CHANGE: 0

## 2020-02-12 NOTE — TELEPHONE ENCOUNTER
Calling to move pt appt up. He can be triple booked anywhere on the schedule if needed per katina. The sooner on the schedule the better.

## 2020-02-12 NOTE — PROGRESS NOTES
Subjective:      Patient ID: Ai Gee is a 71 y.o. male. HPI    Dr. Michael Longo PA-C our mutual patient Ai Gee was seen  for   1. Polyp of colon, unspecified part of colon, unspecified type    2. Internal hemorrhoids without complication     . The patient is here as a follow up of his recent GI procedure. The results have been sent to you separately   The findings were explained to the patient in detail and biopsies were also discussed   with him    T adenoma  Had recent screening  Pt is clinically feeling well GI wise  Has No significant abdominal pains, bloating or cramping  Has no sig GERD symptoms  Has no Dysphagia, No Nausea or any vomiting  Denies any rectal bleeding or any melena  Denies any sig constipation or any diarrhea symptoms  Weight is stable and appetite is generally good. No fam hx   No smoking    Past Medical, Family, and Social History reviewed and does contribute to the patient presenting condition. patient\"s PMH/PSH,SH,PSYCH hx, MEDs, ALLERGIES, and ROS was all reviewed and updated ion the appropriate sections    Review of Systems   Constitutional: Negative for appetite change, fatigue and unexpected weight change. HENT: Negative for dental problem, postnasal drip, sinus pressure, sore throat, trouble swallowing and voice change. Eyes: Positive for visual disturbance. Respiratory: Negative for cough, choking and wheezing. Cardiovascular: Negative for chest pain, palpitations and leg swelling. Gastrointestinal: Negative for abdominal distention, abdominal pain, anal bleeding, blood in stool, constipation, diarrhea, nausea, rectal pain and vomiting. Genitourinary: Negative for difficulty urinating. Musculoskeletal: Positive for back pain. Negative for arthralgias, gait problem and myalgias. Allergic/Immunologic: Negative for environmental allergies and food allergies.    Neurological: Negative for dizziness, weakness, light-headedness, numbness and headaches. Hematological: Does not bruise/bleed easily. Psychiatric/Behavioral: Positive for sleep disturbance. The patient is not nervous/anxious. Reviewed  and agree  Objective:   Physical Exam  Nursing note reviewed. Constitutional:       Appearance: He is well-developed. Comments: Anxious    HENT:      Head: Normocephalic and atraumatic. Eyes:      Conjunctiva/sclera: Conjunctivae normal.      Pupils: Pupils are equal, round, and reactive to light. Neck:      Musculoskeletal: Normal range of motion and neck supple. Cardiovascular:      Rate and Rhythm: Normal rate and regular rhythm. Pulmonary:      Effort: Pulmonary effort is normal.      Breath sounds: Normal breath sounds. Abdominal:      General: Bowel sounds are normal.      Palpations: Abdomen is soft. Comments: NON TENDER, NON DISTENTED  LIVER SPLEEN AND HERNIAS ARE NOT  PALPABLE  BOWEL SOUNDS ARE POSITIVE      Genitourinary:     Rectum: Normal.   Musculoskeletal: Normal range of motion. Skin:     General: Skin is warm. Neurological:      Mental Status: He is alert and oriented to person, place, and time. Deep Tendon Reflexes: Reflexes are normal and symmetric.          Assessment:      Patient Active Problem List   Diagnosis    Chronic pain syndrome    DJD (degenerative joint disease), thoracic    Obesity    ED (erectile dysfunction)    Neuropathy    DJD (degenerative joint disease), lumbosacral    Anemia    Sleep disorder    Internal derangement of knee joint    Unstable gait    Type 2 diabetes mellitus without complication (HCC)    Spondylosis of cervical region without myelopathy or radiculopathy    Essential hypertension    Mixed hyperlipidemia    Complete tear of right rotator cuff    Obstructive rhinitis    Closed trimalleolar fracture of right ankle    Fatigue due to exposure    Gastroesophageal reflux disease    Tear of right supraspinatus tendon    Tear of infraspinatus tendon, right, initial encounter    Tendinosis of right shoulder    Colon polyp           Plan:      Pt was advised in detail about some life style and dietary modifications. He was advised about avoidance of caffeine, nicotine and chocolate. Pt was also told to stay away from any kind of fast foods, soda pops. He was also advised to avoid lots of spices, grease and fried food etc.     Instructions were also given about trying to arrange the timing, quality and quantity of food. Instructions were given about using ample amount of fiber including dietary and supplemental fiber either metamucil, bennafiber or citrucell etc.  Pt was advised about drinking ample amount of water without any colors or chemicals. Stress was given about regular exercise. Pt has verbalized understanding and agreement to these modifications.         The patient was instructed to start taking some OTC Probiotics products   These are available over the counter at the Pharmacy stores and Grocery stores  He was explained about the beneficial effects they have in the GI track  They will help to establish the good bacterial jadyn and will help with the digestion and bowel movements  The patient has verbalized understanding and agreement to this plan    More than half of patient's clinic visit time was spent in counseling about lifestyle and dietary modifications  Patient's  questions were answered in this regard as well  The patient has verbalized understanding and agreement

## 2020-05-21 ENCOUNTER — APPOINTMENT (OUTPATIENT)
Dept: CT IMAGING | Age: 65
End: 2020-05-21
Payer: COMMERCIAL

## 2020-05-21 ENCOUNTER — HOSPITAL ENCOUNTER (EMERGENCY)
Age: 65
Discharge: HOME | End: 2020-05-21
Attending: EMERGENCY MEDICINE
Payer: COMMERCIAL

## 2020-05-21 VITALS
DIASTOLIC BLOOD PRESSURE: 96 MMHG | TEMPERATURE: 98.2 F | SYSTOLIC BLOOD PRESSURE: 170 MMHG | HEIGHT: 72 IN | WEIGHT: 217 LBS | OXYGEN SATURATION: 95 % | HEART RATE: 80 BPM | BODY MASS INDEX: 29.39 KG/M2 | RESPIRATION RATE: 14 BRPM

## 2020-05-21 PROCEDURE — 6370000000 HC RX 637 (ALT 250 FOR IP): Performed by: STUDENT IN AN ORGANIZED HEALTH CARE EDUCATION/TRAINING PROGRAM

## 2020-05-21 PROCEDURE — 70450 CT HEAD/BRAIN W/O DYE: CPT

## 2020-05-21 PROCEDURE — 72125 CT NECK SPINE W/O DYE: CPT

## 2020-05-21 PROCEDURE — 99283 EMERGENCY DEPT VISIT LOW MDM: CPT

## 2020-05-21 RX ORDER — NAPROXEN 500 MG/1
500 TABLET ORAL 2 TIMES DAILY PRN
Qty: 14 TABLET | Refills: 0 | Status: SHIPPED | OUTPATIENT
Start: 2020-05-21 | End: 2021-11-18 | Stop reason: ALTCHOICE

## 2020-05-21 RX ORDER — ACETAMINOPHEN 500 MG
500 TABLET ORAL EVERY 6 HOURS PRN
Qty: 28 TABLET | Refills: 0 | Status: SHIPPED | OUTPATIENT
Start: 2020-05-21 | End: 2021-11-18 | Stop reason: ALTCHOICE

## 2020-05-21 RX ORDER — ACETAMINOPHEN 500 MG
1000 TABLET ORAL ONCE
Status: COMPLETED | OUTPATIENT
Start: 2020-05-21 | End: 2020-05-21

## 2020-05-21 RX ORDER — ONDANSETRON 4 MG/1
4 TABLET, ORALLY DISINTEGRATING ORAL EVERY 8 HOURS PRN
Qty: 21 TABLET | Refills: 0 | Status: SHIPPED | OUTPATIENT
Start: 2020-05-21 | End: 2020-05-28

## 2020-05-21 RX ADMIN — ACETAMINOPHEN 1000 MG: 500 TABLET ORAL at 08:48

## 2020-05-21 ASSESSMENT — PAIN SCALES - GENERAL: PAINLEVEL_OUTOF10: 7

## 2020-05-21 ASSESSMENT — ENCOUNTER SYMPTOMS
SHORTNESS OF BREATH: 0
ABDOMINAL PAIN: 0
EYE DISCHARGE: 0
EYE REDNESS: 0

## 2020-05-21 NOTE — ED PROVIDER NOTES
[facility]     Emergency Department     Faculty Attestation    I performed a history and physical examination of the patient and discussed management with the resident. I reviewed the residents note and agree with the documented findings including all diagnostic interpretations and plan of care. Any areas of disagreement are noted on the chart. I was personally present for the key portions of any procedures. I have documented in the chart those procedures where I was not present during the key portions. I have reviewed the emergency nurses triage note. I agree with the chief complaint, past medical history, past surgical history, allergies, medications, social and family history as documented unless otherwise noted below. Documentation of the HPI, Physical Exam and Medical Decision Making performed by scribes is based on my personal performance of the HPI, PE and MDM. For Physician Assistant/ Nurse Practitioner cases/documentation I have personally evaluated this patient and have completed at least one if not all key elements of the E/M (history, physical exam, and MDM). Additional findings are as noted. This patient was evaluated in the Emergency Department for symptoms described in the history of present illness. He/she was evaluated in the context of the global COVID-19 pandemic, which necessitated consideration that the patient might be at risk for infection with the SARS-CoV-2 virus that causes COVID-19. Institutional protocols and algorithms that pertain to the evaluation of patients at risk for COVID-19 are in a state of rapid change based on information released by regulatory bodies including the CDC and federal and state organizations. These policies and algorithms were followed during the patient's care in the ED. Primary Care Physician: Carlene Real PA-C    History:  This is a 71 y.o. male who presents to the Emergency Department with

## 2020-05-21 NOTE — ED PROVIDER NOTES
Spouse name: Not on file    Number of children: Not on file    Years of education: Not on file    Highest education level: Not on file   Occupational History    Not on file   Social Needs    Financial resource strain: Not on file    Food insecurity     Worry: Not on file     Inability: Not on file    Transportation needs     Medical: Not on file     Non-medical: Not on file   Tobacco Use    Smoking status: Former Smoker     Packs/day: 1.00     Years: 10.00     Pack years: 10.00     Last attempt to quit: 1991     Years since quittin.4    Smokeless tobacco: Never Used   Substance and Sexual Activity    Alcohol use: No    Drug use: No    Sexual activity: Yes   Lifestyle    Physical activity     Days per week: Not on file     Minutes per session: Not on file    Stress: Not on file   Relationships    Social connections     Talks on phone: Not on file     Gets together: Not on file     Attends Shinto service: Not on file     Active member of club or organization: Not on file     Attends meetings of clubs or organizations: Not on file     Relationship status: Not on file    Intimate partner violence     Fear of current or ex partner: Not on file     Emotionally abused: Not on file     Physically abused: Not on file     Forced sexual activity: Not on file   Other Topics Concern    Not on file   Social History Narrative    Not on file       Family History   Problem Relation Age of Onset    Diabetes Mother     High Blood Pressure Mother     Heart Failure Mother     Heart Attack Father        Allergies:  Meloxicam and Robaxin  [methocarbamol]    Home Medications:  Prior to Admission medications    Medication Sig Start Date End Date Taking?  Authorizing Provider   ondansetron (ZOFRAN-ODT) 4 MG disintegrating tablet Place 1 tablet under the tongue every 8 hours as needed for Nausea or Vomiting 20 Yes Dakota Gave, DO   naproxen (NAPROSYN) 500 MG tablet Take 1 tablet to his closed head injury and concussion. Counseled him to abstain from screens and bright lights. Urged him to follow-up with primary care doctor return emergency department for worsening signs or symptoms. · Based on the low acuity of concerning symptoms and improvement of symptoms, patient will be discharged with follow up and prescription information listed in the Disposition section. · Patient states they will follow-up with primary care physician and/or return to the emergency department should they experience a change or worsening of symptoms. · Patient will be discharged with resources: summary of visit, instructions, follow-up information, prescriptions if necessary and clinics available. · Patient/ family instructed to read discharge paperwork. All of their questions and concerns were addressed. · Suspicion for any acute life-threatening processes is low. Patient voices understanding of plan. PROCEDURES:  None    CONSULTS:  None    CRITICAL CARE:  Please see attending note    FINAL IMPRESSION      1.  Head injury, closed, with brief LOC ([facility]          DISPOSITION / PLAN     DISPOSITION Decision To Discharge 05/21/2020 09:05:29 AM        PATIENTREFERRED TO:  Gil Claudio PA-C  [address]  [phone]    Schedule an appointment as soon as possible for a visit in 1 week  Follow up within 1 week, Return to ED sooner if symptoms worsen,       DISCHARGE MEDICATIONS:  Discharge Medication List as of 5/21/2020  9:28 AM      START taking these medications    Details   ondansetron (ZOFRAN-ODT) 4 MG disintegrating tablet Place 1 tablet under the tongue every 8 hours as needed for Nausea or Vomiting, Disp-21 tablet, R-0May Sub regular tablet (non-ODT) if insurance does not cover ODTPrint      naproxen (NAPROSYN) 500 MG tablet Take 1 tablet by mouth 2 times daily as needed for Pain, Disp-14 tablet, R-0Print      acetaminophen (TYLENOL) 500 MG tablet Take 1 tablet by mouth

## 2020-06-04 ENCOUNTER — OFFICE VISIT (OUTPATIENT)
Dept: PRIMARY CARE CLINIC | Age: 65
End: 2020-06-04
Payer: COMMERCIAL

## 2020-06-04 VITALS
TEMPERATURE: 97.5 F | DIASTOLIC BLOOD PRESSURE: 76 MMHG | HEIGHT: 72 IN | WEIGHT: 222.6 LBS | BODY MASS INDEX: 30.15 KG/M2 | HEART RATE: 109 BPM | OXYGEN SATURATION: 95 % | SYSTOLIC BLOOD PRESSURE: 154 MMHG

## 2020-06-04 PROCEDURE — 99214 OFFICE O/P EST MOD 30 MIN: CPT | Performed by: PHYSICIAN ASSISTANT

## 2020-06-04 PROCEDURE — 4040F PNEUMOC VAC/ADMIN/RCVD: CPT | Performed by: PHYSICIAN ASSISTANT

## 2020-06-04 PROCEDURE — 3017F COLORECTAL CA SCREEN DOC REV: CPT | Performed by: PHYSICIAN ASSISTANT

## 2020-06-04 PROCEDURE — 1036F TOBACCO NON-USER: CPT | Performed by: PHYSICIAN ASSISTANT

## 2020-06-04 PROCEDURE — G8427 DOCREV CUR MEDS BY ELIG CLIN: HCPCS | Performed by: PHYSICIAN ASSISTANT

## 2020-06-04 PROCEDURE — G8417 CALC BMI ABV UP PARAM F/U: HCPCS | Performed by: PHYSICIAN ASSISTANT

## 2020-06-04 PROCEDURE — 1123F ACP DISCUSS/DSCN MKR DOCD: CPT | Performed by: PHYSICIAN ASSISTANT

## 2020-06-04 PROCEDURE — 69210 REMOVE IMPACTED EAR WAX UNI: CPT | Performed by: PHYSICIAN ASSISTANT

## 2020-06-04 NOTE — PROGRESS NOTES
HEMOGLOBIN A1C:   Lab Results   Component Value Date    LABA1C 5.7 01/06/2020       FASTING LIPID PANEL:  Lab Results   Component Value Date    CHOL 229 (H) 10/09/2019    HDL 53 10/09/2019    TRIG 95 10/09/2019       ASSESSMENT AND PLAN:  Sebastien Delacruz was seen today for follow-up from hospital.    Diagnoses and all orders for this visit:    Impacted cerumen of left ear  -     37639 - LA REMOVE IMPACTED EAR WAX    Essential hypertension      FOLLOW UP AND INSTRUCTIONS:  Return in about 1 week (around 6/11/2020) for Ear Irrigation. · Sebastien Delacruz received counseling on the following healthy behaviors:nutrition and exercise    · Discussed use, benefit, and side effects of prescribed medications. Barriers to medication compliance addressed. All patient questions answered. Pt voiced understanding. · Patient given educational materials - see patient instructions    Electronically signed by Paul Nichols PA-C on 6/4/20 at 1:19 PM EDT    This note is created with the assistance of a speech-recognition program. While intendingto generate a document that actually reflects the content of the visit, the document can still have some mistakes which may not have been identified and corrected by editing.

## 2020-06-08 ASSESSMENT — ENCOUNTER SYMPTOMS
VOMITING: 0
NAUSEA: 0
CONSTIPATION: 0
COUGH: 0
SHORTNESS OF BREATH: 0
DIARRHEA: 0
WHEEZING: 0
BACK PAIN: 0

## 2020-07-08 RX ORDER — LISINOPRIL 40 MG/1
TABLET ORAL
Qty: 90 TABLET | Refills: 0 | Status: SHIPPED | OUTPATIENT
Start: 2020-07-08 | End: 2020-10-13

## 2020-07-08 NOTE — TELEPHONE ENCOUNTER
Health Maintenance   Topic Date Due    DTaP/Tdap/Td vaccine (1 - Tdap) 07/20/1973    Shingles Vaccine (1 of 2) 07/20/2004    Diabetic foot exam  10/30/2016    Diabetic retinal exam  03/30/2019    Diabetic microalbuminuria test  10/27/2019    Potassium monitoring  03/13/2020    Creatinine monitoring  03/13/2020    Flu vaccine (1) 09/01/2020    Lipid screen  10/09/2020    A1C test (Diabetic or Prediabetic)  01/06/2021    Pneumococcal 65+ years Vaccine (1 of 1 - PPSV23) 04/18/2022    Colon cancer screen colonoscopy  12/02/2029    AAA screen  Completed    Hepatitis C screen  Completed    HIV screen  Completed    Hepatitis A vaccine  Aged Out    Hib vaccine  Aged Out    Meningococcal (ACWY) vaccine  Aged Out             (applicable per patient's age: Cancer Screenings, Depression Screening, Fall Risk Screening, Immunizations)    Hemoglobin A1C (%)   Date Value   01/06/2020 5.7   10/09/2019 5.8   10/27/2018 5.6     Microalb/Crt.  Ratio (mcg/mg creat)   Date Value   10/27/2018 CANNOT BE CALCULATED     LDL Cholesterol (mg/dL)   Date Value   10/09/2019 157 (H)     AST (U/L)   Date Value   10/09/2019 18     ALT (U/L)   Date Value   10/09/2019 20     BUN (mg/dL)   Date Value   03/13/2019 14      (goal A1C is < 7)   (goal LDL is <100) need 30-50% reduction from baseline     BP Readings from Last 3 Encounters:   06/04/20 (!) 154/76   05/21/20 (!) 170/96   02/12/20 (!) 176/90    (goal /80)      All Future Testing planned in [facility]:  Lab Frequency Next Occurrence       Next Visit Date:  Future Appointments   Date Time Provider Joey Kumar   10/12/2020  4:20 PM Mitali Hardwick PA-C [address]            Patient Active Problem List:     Chronic pain syndrome     DJD (degenerative joint disease), thoracic     Obesity     ED (erectile dysfunction)     Neuropathy     DJD (degenerative joint disease), lumbosacral     Anemia     Sleep disorder     Internal derangement of knee joint     Unstable gait     Type 2 diabetes mellitus without complication (HCC)     Spondylosis of cervical region without myelopathy or radiculopathy     Essential hypertension     Mixed hyperlipidemia     Complete tear of right rotator cuff     Obstructive rhinitis     Closed trimalleolar fracture of right ankle     Fatigue due to exposure     Gastroesophageal reflux disease     Tear of right supraspinatus tendon     Tear of infraspinatus tendon, right, initial encounter     Tendinosis of right shoulder     Colon polyp

## 2020-07-24 RX ORDER — ZOLPIDEM TARTRATE 12.5 MG/1
TABLET, FILM COATED, EXTENDED RELEASE ORAL
Qty: 12 TABLET | Refills: 1 | Status: SHIPPED | OUTPATIENT
Start: 2020-07-24 | End: 2020-09-22

## 2020-07-24 NOTE — TELEPHONE ENCOUNTER
mellitus without complication (HCC)     Spondylosis of cervical region without myelopathy or radiculopathy     Essential hypertension     Mixed hyperlipidemia     Complete tear of right rotator cuff     Obstructive rhinitis     Closed trimalleolar fracture of right ankle     Fatigue due to exposure     Gastroesophageal reflux disease     Tear of right supraspinatus tendon     Tear of infraspinatus tendon, right, initial encounter     Tendinosis of right shoulder     Colon polyp

## 2020-09-17 ENCOUNTER — TELEPHONE (OUTPATIENT)
Dept: PRIMARY CARE CLINIC | Age: 65
End: 2020-09-17

## 2020-10-13 RX ORDER — LISINOPRIL 40 MG/1
TABLET ORAL
Qty: 90 TABLET | Refills: 0 | Status: SHIPPED | OUTPATIENT
Start: 2020-10-13 | End: 2021-01-05

## 2020-10-13 NOTE — TELEPHONE ENCOUNTER
Health Maintenance   Topic Date Due    DTaP/Tdap/Td vaccine (1 - Tdap) 07/20/1973    Shingles Vaccine (1 of 2) 07/20/2004    Diabetic foot exam  10/30/2016    Diabetic retinal exam  03/30/2019    Diabetic microalbuminuria test  10/27/2019    Potassium monitoring  03/13/2020    Creatinine monitoring  03/13/2020    Flu vaccine (1) 09/01/2020    Lipid screen  10/09/2020    A1C test (Diabetic or Prediabetic)  01/06/2021    Pneumococcal 65+ years Vaccine (1 of 1 - PPSV23) 04/18/2022    Colon cancer screen colonoscopy  12/02/2029    AAA screen  Completed    Hepatitis C screen  Completed    Hepatitis A vaccine  Aged Out    Hib vaccine  Aged Out    Meningococcal (ACWY) vaccine  Aged Out             (applicable per patient's age: Cancer Screenings, Depression Screening, Fall Risk Screening, Immunizations)    Hemoglobin A1C (%)   Date Value   01/06/2020 5.7   10/09/2019 5.8   10/27/2018 5.6     Microalb/Crt. Ratio (mcg/mg creat)   Date Value   10/27/2018 CANNOT BE CALCULATED     LDL Cholesterol (mg/dL)   Date Value   10/09/2019 157 (H)     AST (U/L)   Date Value   10/09/2019 18     ALT (U/L)   Date Value   10/09/2019 20     BUN (mg/dL)   Date Value   03/13/2019 14      (goal A1C is < 7)   (goal LDL is <100) need 30-50% reduction from baseline     BP Readings from Last 3 Encounters:   06/04/20 (!) 154/76   05/21/20 (!) 170/96   02/12/20 (!) 176/90    (goal /80)      All Future Testing planned in [facility]:  Lab Frequency Next Occurrence       Next Visit Date:  No future appointments.          Patient Active Problem List:     Chronic pain syndrome     DJD (degenerative joint disease), thoracic     Obesity     ED (erectile dysfunction)     Neuropathy     DJD (degenerative joint disease), lumbosacral     Anemia     Sleep disorder     Internal derangement of knee joint     Unstable gait     Type 2 diabetes mellitus without complication ([facility]     Spondylosis of cervical region without myelopathy or radiculopathy     Essential hypertension     Mixed hyperlipidemia     Complete tear of right rotator cuff     Obstructive rhinitis     Closed trimalleolar fracture of right ankle     Fatigue due to exposure     Gastroesophageal reflux disease     Tear of right supraspinatus tendon     Tear of infraspinatus tendon, right, initial encounter     Tendinosis of right shoulder     Colon polyp

## 2020-11-11 ENCOUNTER — TELEPHONE (OUTPATIENT)
Dept: PRIMARY CARE CLINIC | Age: 65
End: 2020-11-11

## 2020-12-16 ENCOUNTER — TELEPHONE (OUTPATIENT)
Dept: PRIMARY CARE CLINIC | Age: 65
End: 2020-12-16

## 2020-12-31 RX ORDER — TIZANIDINE 4 MG/1
TABLET ORAL
Qty: 30 TABLET | Refills: 1 | Status: SHIPPED | OUTPATIENT
Start: 2020-12-31 | End: 2021-01-26 | Stop reason: SDUPTHER

## 2020-12-31 NOTE — TELEPHONE ENCOUNTER
Health Maintenance   Topic Date Due    DTaP/Tdap/Td vaccine (1 - Tdap) 07/20/1973    Shingles Vaccine (1 of 2) 07/20/2004    Diabetic foot exam  10/30/2016    Diabetic retinal exam  03/30/2019    Diabetic microalbuminuria test  10/27/2019    Potassium monitoring  03/13/2020    Creatinine monitoring  03/13/2020    Flu vaccine (1) 09/01/2020    Lipid screen  10/09/2020    A1C test (Diabetic or Prediabetic)  01/06/2021    Pneumococcal 65+ years Vaccine (1 of 1 - PPSV23) 04/18/2022    Colon cancer screen colonoscopy  12/02/2029    AAA screen  Completed    Hepatitis C screen  Completed    Hepatitis A vaccine  Aged Out    Hib vaccine  Aged Out    Meningococcal (ACWY) vaccine  Aged Out             (applicable per patient's age: Cancer Screenings, Depression Screening, Fall Risk Screening, Immunizations)    Hemoglobin A1C (%)   Date Value   01/06/2020 5.7   10/09/2019 5.8   10/27/2018 5.6     Microalb/Crt.  Ratio (mcg/mg creat)   Date Value   10/27/2018 CANNOT BE CALCULATED     LDL Cholesterol (mg/dL)   Date Value   10/09/2019 157 (H)     AST (U/L)   Date Value   10/09/2019 18     ALT (U/L)   Date Value   10/09/2019 20     BUN (mg/dL)   Date Value   03/13/2019 14      (goal A1C is < 7)   (goal LDL is <100) need 30-50% reduction from baseline     BP Readings from Last 3 Encounters:   06/04/20 (!) 154/76   05/21/20 (!) 170/96   02/12/20 (!) 176/90    (goal /80)      All Future Testing planned in [facility]:  Lab Frequency Next Occurrence       Next Visit Date:  Future Appointments   Date Time Provider Joey Kumar   1/26/2021  5:20 PM Karin Medina PA-C [address]            Patient Active Problem List:     Chronic pain syndrome     DJD (degenerative joint disease), thoracic     Obesity     ED (erectile dysfunction)     Neuropathy     DJD (degenerative joint disease), lumbosacral     Anemia     Sleep disorder     Internal derangement of knee joint     Unstable gait     Type 2 diabetes mellitus without complication (HCC)     Spondylosis of cervical region without myelopathy or radiculopathy     Essential hypertension     Mixed hyperlipidemia     Complete tear of right rotator cuff     Obstructive rhinitis     Closed trimalleolar fracture of right ankle     Fatigue due to exposure     Gastroesophageal reflux disease     Tear of right supraspinatus tendon     Tear of infraspinatus tendon, right, initial encounter     Tendinosis of right shoulder     Colon polyp

## 2020-12-31 NOTE — TELEPHONE ENCOUNTER
Health Maintenance   Topic Date Due    DTaP/Tdap/Td vaccine (1 - Tdap) 07/20/1973    Shingles Vaccine (1 of 2) 07/20/2004    Diabetic foot exam  10/30/2016    Diabetic retinal exam  03/30/2019    Diabetic microalbuminuria test  10/27/2019    Potassium monitoring  03/13/2020    Creatinine monitoring  03/13/2020    Flu vaccine (1) 09/01/2020    Lipid screen  10/09/2020    A1C test (Diabetic or Prediabetic)  01/06/2021    Pneumococcal 65+ years Vaccine (1 of 1 - PPSV23) 04/18/2022    Colon cancer screen colonoscopy  12/02/2029    AAA screen  Completed    Hepatitis C screen  Completed    Hepatitis A vaccine  Aged Out    Hib vaccine  Aged Out    Meningococcal (ACWY) vaccine  Aged Out             (applicable per patient's age: Cancer Screenings, Depression Screening, Fall Risk Screening, Immunizations)    Hemoglobin A1C (%)   Date Value   01/06/2020 5.7   10/09/2019 5.8   10/27/2018 5.6     Microalb/Crt.  Ratio (mcg/mg creat)   Date Value   10/27/2018 CANNOT BE CALCULATED     LDL Cholesterol (mg/dL)   Date Value   10/09/2019 157 (H)     AST (U/L)   Date Value   10/09/2019 18     ALT (U/L)   Date Value   10/09/2019 20     BUN (mg/dL)   Date Value   03/13/2019 14      (goal A1C is < 7)   (goal LDL is <100) need 30-50% reduction from baseline     BP Readings from Last 3 Encounters:   06/04/20 (!) 154/76   05/21/20 (!) 170/96   02/12/20 (!) 176/90    (goal /80)      All Future Testing planned in [facility]:  Lab Frequency Next Occurrence       Next Visit Date:  Future Appointments   Date Time Provider oJey Kumar   1/26/2021  5:20 PM Amparo Crowley PA-C [address]            Patient Active Problem List:     Chronic pain syndrome     DJD (degenerative joint disease), thoracic     Obesity     ED (erectile dysfunction)     Neuropathy     DJD (degenerative joint disease), lumbosacral     Anemia     Sleep disorder     Internal derangement of knee joint     Unstable gait     Type 2 diabetes mellitus without complication (HCC)     Spondylosis of cervical region without myelopathy or radiculopathy     Essential hypertension     Mixed hyperlipidemia     Complete tear of right rotator cuff     Obstructive rhinitis     Closed trimalleolar fracture of right ankle     Fatigue due to exposure     Gastroesophageal reflux disease     Tear of right supraspinatus tendon     Tear of infraspinatus tendon, right, initial encounter     Tendinosis of right shoulder     Colon polyp

## 2021-01-05 DIAGNOSIS — I10 ESSENTIAL HYPERTENSION: ICD-10-CM

## 2021-01-05 RX ORDER — LISINOPRIL 40 MG/1
TABLET ORAL
Qty: 90 TABLET | Refills: 0 | Status: SHIPPED | OUTPATIENT
Start: 2021-01-05 | End: 2021-03-31

## 2021-01-05 NOTE — TELEPHONE ENCOUNTER
Health Maintenance   Topic Date Due    DTaP/Tdap/Td vaccine (1 - Tdap) 07/20/1973    Shingles Vaccine (1 of 2) 07/20/2004    Diabetic foot exam  10/30/2016    Diabetic retinal exam  03/30/2019    Diabetic microalbuminuria test  10/27/2019    Potassium monitoring  03/13/2020    Creatinine monitoring  03/13/2020    Flu vaccine (1) 09/01/2020    Lipid screen  10/09/2020    A1C test (Diabetic or Prediabetic)  01/06/2021    Pneumococcal 65+ years Vaccine (1 of 1 - PPSV23) 04/18/2022    Colon cancer screen colonoscopy  12/02/2029    AAA screen  Completed    Hepatitis C screen  Completed    Hepatitis A vaccine  Aged Out    Hib vaccine  Aged Out    Meningococcal (ACWY) vaccine  Aged Out             (applicable per patient's age: Cancer Screenings, Depression Screening, Fall Risk Screening, Immunizations)    Hemoglobin A1C (%)   Date Value   01/06/2020 5.7   10/09/2019 5.8   10/27/2018 5.6     Microalb/Crt.  Ratio (mcg/mg creat)   Date Value   10/27/2018 CANNOT BE CALCULATED     LDL Cholesterol (mg/dL)   Date Value   10/09/2019 157 (H)     AST (U/L)   Date Value   10/09/2019 18     ALT (U/L)   Date Value   10/09/2019 20     BUN (mg/dL)   Date Value   03/13/2019 14      (goal A1C is < 7)   (goal LDL is <100) need 30-50% reduction from baseline     BP Readings from Last 3 Encounters:   06/04/20 (!) 154/76   05/21/20 (!) 170/96   02/12/20 (!) 176/90    (goal /80)      All Future Testing planned in [facility]:  Lab Frequency Next Occurrence       Next Visit Date:  Future Appointments   Date Time Provider Joey Kumar   1/26/2021  5:20 PM Ralf Armenta PA-C [address]            Patient Active Problem List:     Chronic pain syndrome     DJD (degenerative joint disease), thoracic     Obesity     ED (erectile dysfunction)     Neuropathy     DJD (degenerative joint disease), lumbosacral     Anemia     Sleep disorder     Internal derangement of knee joint     Unstable gait     Type 2 diabetes mellitus without complication (HCC)     Spondylosis of cervical region without myelopathy or radiculopathy     Essential hypertension     Mixed hyperlipidemia     Complete tear of right rotator cuff     Obstructive rhinitis     Closed trimalleolar fracture of right ankle     Fatigue due to exposure     Gastroesophageal reflux disease     Tear of right supraspinatus tendon     Tear of infraspinatus tendon, right, initial encounter     Tendinosis of right shoulder     Colon polyp

## 2021-01-26 ENCOUNTER — OFFICE VISIT (OUTPATIENT)
Dept: PRIMARY CARE CLINIC | Age: 66
End: 2021-01-26
Payer: COMMERCIAL

## 2021-01-26 VITALS
TEMPERATURE: 97.9 F | WEIGHT: 221 LBS | OXYGEN SATURATION: 99 % | SYSTOLIC BLOOD PRESSURE: 176 MMHG | DIASTOLIC BLOOD PRESSURE: 86 MMHG | HEART RATE: 76 BPM | BODY MASS INDEX: 29.97 KG/M2

## 2021-01-26 DIAGNOSIS — Z76.0 MEDICATION REFILL: ICD-10-CM

## 2021-01-26 DIAGNOSIS — E66.3 OVERWEIGHT (BMI 25.0-29.9): ICD-10-CM

## 2021-01-26 DIAGNOSIS — R73.9 HYPERGLYCEMIA: ICD-10-CM

## 2021-01-26 DIAGNOSIS — M47.817 DJD (DEGENERATIVE JOINT DISEASE), LUMBOSACRAL: ICD-10-CM

## 2021-01-26 DIAGNOSIS — I10 ESSENTIAL HYPERTENSION: ICD-10-CM

## 2021-01-26 DIAGNOSIS — Z00.00 HEALTHCARE MAINTENANCE: ICD-10-CM

## 2021-01-26 DIAGNOSIS — G47.00 INSOMNIA, UNSPECIFIED TYPE: ICD-10-CM

## 2021-01-26 DIAGNOSIS — N52.9 ERECTILE DYSFUNCTION, UNSPECIFIED ERECTILE DYSFUNCTION TYPE: ICD-10-CM

## 2021-01-26 DIAGNOSIS — Z00.00 ANNUAL PHYSICAL EXAM: Primary | ICD-10-CM

## 2021-01-26 DIAGNOSIS — E78.2 MIXED HYPERLIPIDEMIA: ICD-10-CM

## 2021-01-26 PROCEDURE — G8484 FLU IMMUNIZE NO ADMIN: HCPCS | Performed by: PHYSICIAN ASSISTANT

## 2021-01-26 PROCEDURE — 99397 PER PM REEVAL EST PAT 65+ YR: CPT | Performed by: PHYSICIAN ASSISTANT

## 2021-01-26 RX ORDER — SILDENAFIL 50 MG/1
25 TABLET, FILM COATED ORAL PRN
Qty: 24 TABLET | Refills: 5 | Status: SHIPPED | OUTPATIENT
Start: 2021-01-26

## 2021-01-26 RX ORDER — NIFEDIPINE 30 MG/1
30 TABLET, FILM COATED, EXTENDED RELEASE ORAL DAILY
Qty: 90 TABLET | Refills: 1 | Status: SHIPPED | OUTPATIENT
Start: 2021-01-26 | End: 2021-01-29 | Stop reason: DRUGHIGH

## 2021-01-26 RX ORDER — ZOLPIDEM TARTRATE 5 MG/1
TABLET ORAL
COMMUNITY
End: 2021-01-26

## 2021-01-26 RX ORDER — TIZANIDINE 4 MG/1
2-4 TABLET ORAL DAILY
Qty: 90 TABLET | Refills: 1 | Status: SHIPPED | OUTPATIENT
Start: 2021-01-26

## 2021-01-26 RX ORDER — ZOLPIDEM TARTRATE 12.5 MG/1
12.5 TABLET, FILM COATED, EXTENDED RELEASE ORAL NIGHTLY PRN
Qty: 30 TABLET | Refills: 1 | Status: SHIPPED | OUTPATIENT
Start: 2021-01-26 | End: 2021-02-03 | Stop reason: SDUPTHER

## 2021-01-26 ASSESSMENT — PATIENT HEALTH QUESTIONNAIRE - PHQ9
SUM OF ALL RESPONSES TO PHQ QUESTIONS 1-9: 0
1. LITTLE INTEREST OR PLEASURE IN DOING THINGS: 0

## 2021-01-26 NOTE — PROGRESS NOTES
[facility]  [address] [facility] [address]  Dept: [phone]  Dept Fax: [phone]    Office Progress/Follow Up Note    Date of patient's visit: 1/26/2021    Patient's Name:  Jose Joya YOB: 1955            Patient Care Team:  Cyndee Rodriguez PA-C as PCP - General  Cyndee Rodriguez PA-C as PCP - [facility] Empaneled Provider  ================================================================    REASON FOR VISIT/CHIEF COMPLAINT:  Check-Up and Medication Refill    HISTORY OF PRESENTING ILLNESS:  History was obtained from: patient. Jose Joya is a 76 y.o. is here for physical, follow-up for high blood pressure, HLD, medication refill. Patient states he is compliant with medications. Patient states \"stopped taking cholesterol medication 6 months ago\". Informed patient lab will be ordered to assess cholesterol level and if elevated will need to be started on Lipitor, patient voiced understanding. Patient blood pressure is elevated in office. Discussed elevated blood pressure and risk in depth with patient, informed patient we will increase his nifedipine to 60 mg. Patient weight is stable. Patient requesting additional medication refill for DJD lumbosacral, hyperglycemia, erectile dysfunction, insomnia, patient denies any side effect of medication. Labs reviewed, informed patient labs will be ordered and to have completed before follow-up appointment, patient voiced understanding. Health maintenance reviewed. OARRS reviewed. Medication reviewed and prescribed.     HPI    Patient Active Problem List   Diagnosis    Chronic pain syndrome    DJD (degenerative joint disease), thoracic    Obesity    ED (erectile dysfunction)    Neuropathy    DJD (degenerative joint disease), lumbosacral    Anemia    Sleep disorder    Internal derangement of knee joint    Unstable gait  Type 2 diabetes mellitus without complication (HCC)    Spondylosis of cervical region without myelopathy or radiculopathy    Essential hypertension    Mixed hyperlipidemia    Complete tear of right rotator cuff    Obstructive rhinitis    Closed trimalleolar fracture of right ankle    Fatigue due to exposure    Gastroesophageal reflux disease    Tear of right supraspinatus tendon    Tear of infraspinatus tendon, right, initial encounter    Tendinosis of right shoulder    Colon polyp       Health Maintenance Due   Topic Date Due    DTaP/Tdap/Td vaccine (1 - Tdap) 07/20/1973    Shingles Vaccine (1 of 2) 07/20/2004    Diabetic foot exam  10/30/2016    Diabetic retinal exam  03/30/2019    Diabetic microalbuminuria test  10/27/2019    Potassium monitoring  03/13/2020    Creatinine monitoring  03/13/2020    Flu vaccine (1) 09/01/2020    Lipid screen  10/09/2020    A1C test (Diabetic or Prediabetic)  01/06/2021       Allergies   Allergen Reactions    Meloxicam      Other reaction(s): Notes: ITCHING    Robaxin  [Methocarbamol]      Other reaction(s): Unknown         Current Outpatient Medications   Medication Sig Dispense Refill    NIFEdipine (ADALAT CC) 60 MG extended release tablet Take 1 tablet by mouth daily 90 tablet 1    tiZANidine (ZANAFLEX) 4 MG tablet Take 0.5-1 tablets by mouth daily 90 tablet 1    metFORMIN (GLUCOPHAGE) 500 MG tablet Take 1 tablet by mouth daily (with breakfast) 90 tablet 1    sildenafil (VIAGRA) 50 MG tablet Take 0.5 tablets by mouth as needed for Erectile Dysfunction 24 tablet 5    zolpidem (AMBIEN CR) 12.5 MG extended release tablet Take 1 tablet by mouth nightly as needed for Sleep for up to 60 days.  30 tablet 1    lisinopril (PRINIVIL;ZESTRIL) 40 MG tablet TAKE 1 TABLET BY MOUTH EVERY DAY 90 tablet 0    diclofenac (VOLTAREN) 50 MG EC tablet TAKE ONE TABLET BY MOUTH THREE TIMES A DAY AS NEEDED FOR PAIN 90 tablet 0  naproxen (NAPROSYN) 500 MG tablet Take 1 tablet by mouth 2 times daily as needed for Pain 14 tablet 0    acetaminophen (TYLENOL) 500 MG tablet Take 1 tablet by mouth every 6 hours as needed for Pain 28 tablet 0    simvastatin (ZOCOR) 80 MG tablet Take 1 tablet by mouth nightly 90 tablet 1    Handicap Placard MISC by Does not apply route 1 each 0    fluticasone (FLONASE) 50 MCG/ACT nasal spray 2 sprays by Nasal route daily 1 Bottle 0    oxyCODONE-acetaminophen (PERCOCET) 5-325 MG per tablet oxycodone-acetaminophen 5 mg-325 mg tablet prn      gabapentin (NEURONTIN) 300 MG capsule gabapentin 600 mg capsule prn      fluocinonide (LIDEX) 0.05 % cream fluocinonide 0.05 % topical cream      ketoconazole (NIZORAL) 2 % shampoo ketoconazole 2 % shampoo      omeprazole (PRILOSEC) 20 MG delayed release capsule omeprazole 20 mg capsule,delayed release prn       No current facility-administered medications for this visit. Social History     Tobacco Use    Smoking status: Former Smoker     Packs/day: 1.00     Years: 10.00     Pack years: 10.00     Quit date: 1991     Years since quittin.0    Smokeless tobacco: Never Used   Substance Use Topics    Alcohol use: No    Drug use: No       Family History   Problem Relation Age of Onset    Diabetes Mother     High Blood Pressure Mother     Heart Failure Mother     Heart Attack Father         REVIEW OFSYSTEMS:  Review of Systems   Constitutional: Negative for chills and fever. HENT: Negative for congestion, hearing loss, rhinorrhea and sore throat. Eyes: Negative for pain and visual disturbance. Respiratory: Negative for cough, shortness of breath and wheezing. Cardiovascular: Negative for chest pain. Gastrointestinal: Negative for abdominal pain, constipation, diarrhea, nausea and vomiting. Genitourinary: Negative for difficulty urinating, dysuria, frequency and urgency. Musculoskeletal: Positive for arthralgias. Negative for back pain and myalgias. Neurological: Negative for dizziness and headaches. PHYSICAL EXAM:  Vitals:    01/26/21 1634 01/26/21 1734   BP: (!) 174/86 (!) 176/86   Site: Left Upper Arm    Position: Sitting    Cuff Size: Large Adult    Pulse: 77 76   Temp: 97.9 °F (36.6 °C)    TempSrc: Temporal    SpO2: 99%    Weight: 221 lb (100.2 kg)      BP Readings from Last 3 Encounters:   01/26/21 (!) 176/86   06/04/20 (!) 154/76   05/21/20 (!) 170/96        Physical Exam  Vitals signs reviewed. Constitutional:       Appearance: Normal appearance. He is well-developed, well-groomed and overweight. HENT:      Head: Normocephalic and atraumatic. Right Ear: Hearing and external ear normal.      Left Ear: Hearing and external ear normal.      Nose: Nose normal.      Mouth/Throat:      Pharynx: Uvula midline. Eyes:      General: Lids are normal.      Conjunctiva/sclera: Conjunctivae normal.      Pupils: Pupils are equal, round, and reactive to light. Cardiovascular:      Rate and Rhythm: Normal rate and regular rhythm. Heart sounds: Normal heart sounds. Pulmonary:      Effort: Pulmonary effort is normal.      Breath sounds: Normal breath sounds. Abdominal:      Palpations: Abdomen is soft. Tenderness: There is no abdominal tenderness. Musculoskeletal: Normal range of motion. General: No tenderness or deformity. Skin:     General: Skin is warm and dry. Neurological:      Mental Status: He is alert and oriented to person, place, and time. Psychiatric:         Speech: Speech normal.         Behavior: Behavior normal. Behavior is cooperative. Thought Content:  Thought content normal.         Judgment: Judgment normal.           DIAGNOSTIC FINDINGS:  CBC:  Lab Results   Component Value Date    WBC 6.4 03/13/2019    HGB 12.8 03/13/2019     03/13/2019     12/23/2011       BMP:    Lab Results   Component Value Date  03/13/2019    K 4.8 03/13/2019     03/13/2019    CO2 27 03/13/2019    BUN 14 03/13/2019    CREATININE 0.99 03/13/2019    GLUCOSE 110 03/13/2019    GLUCOSE 107 12/23/2011       HEMOGLOBIN A1C:   Lab Results   Component Value Date    LABA1C 5.7 01/06/2020       FASTING LIPID PANEL:  Lab Results   Component Value Date    CHOL 229 (H) 10/09/2019    HDL 53 10/09/2019    TRIG 95 10/09/2019       ASSESSMENT AND PLAN:  Rory Aly was seen today for check-up and medication refill. Diagnoses and all orders for this visit:    Annual physical exam    Essential hypertension  -     Discontinue: NIFEdipine (ADALAT CC) 30 MG extended release tablet; Take 1 tablet by mouth daily  -     NIFEdipine (ADALAT CC) 60 MG extended release tablet; Take 1 tablet by mouth daily    Mixed hyperlipidemia    Overweight (BMI 25.0-29. 9)    Healthcare maintenance  -     Hemoglobin A1C; Future  -     Microalbumin, Ur; Future  -     Lipid, Fasting; Future  -     Comprehensive Metabolic Panel; Future  -     CBC; Future    Medication refill  -     tiZANidine (ZANAFLEX) 4 MG tablet; Take 0.5-1 tablets by mouth daily  -     Discontinue: NIFEdipine (ADALAT CC) 30 MG extended release tablet; Take 1 tablet by mouth daily  -     metFORMIN (GLUCOPHAGE) 500 MG tablet; Take 1 tablet by mouth daily (with breakfast)  -     zolpidem (AMBIEN CR) 12.5 MG extended release tablet; Take 1 tablet by mouth nightly as needed for Sleep for up to 60 days.  -     NIFEdipine (ADALAT CC) 60 MG extended release tablet; Take 1 tablet by mouth daily    DJD (degenerative joint disease), lumbosacral  -     tiZANidine (ZANAFLEX) 4 MG tablet; Take 0.5-1 tablets by mouth daily    Hyperglycemia  -     metFORMIN (GLUCOPHAGE) 500 MG tablet; Take 1 tablet by mouth daily (with breakfast)    Erectile dysfunction, unspecified erectile dysfunction type  -     sildenafil (VIAGRA) 50 MG tablet;  Take 0.5 tablets by mouth as needed for Erectile Dysfunction Insomnia, unspecified type  -     zolpidem (AMBIEN CR) 12.5 MG extended release tablet; Take 1 tablet by mouth nightly as needed for Sleep for up to 60 days. FOLLOW UP AND INSTRUCTIONS:  Return in about 5 months (around 6/26/2021) for Lab Review, HM. · Discussed use, benefit, and side effects of prescribed medications. Barriers to medication compliance addressed. All patient questions answered. Pt voiced understanding. · Patient given educational materials - see patient instructions    Electronically signed by Quentin Valdez PA-C on 1/26/21 at 4:57 PM EST    This note is created with the assistance of a speech-recognition program. While intending to generate a document that actually reflects the content of the visit, the document can still have some mistakes which may not have been identified and corrected by editing.

## 2021-01-29 ENCOUNTER — TELEPHONE (OUTPATIENT)
Dept: PRIMARY CARE CLINIC | Age: 66
End: 2021-01-29

## 2021-01-29 RX ORDER — NIFEDIPINE 60 MG/1
60 TABLET, FILM COATED, EXTENDED RELEASE ORAL DAILY
Qty: 90 TABLET | Refills: 1 | Status: SHIPPED | OUTPATIENT
Start: 2021-01-29 | End: 2021-11-22 | Stop reason: SDUPTHER

## 2021-01-29 ASSESSMENT — ENCOUNTER SYMPTOMS
VOMITING: 0
SHORTNESS OF BREATH: 0
BACK PAIN: 0
EYE PAIN: 0
RHINORRHEA: 0
SORE THROAT: 0
WHEEZING: 0
COUGH: 0
CONSTIPATION: 0
NAUSEA: 0
ABDOMINAL PAIN: 0
DIARRHEA: 0

## 2021-01-30 NOTE — TELEPHONE ENCOUNTER
Will inform patient with previous office visit with elevated blood pressure and consistently elevated at previous office visit will increase nifedipine to 60 mg daily. Will instruct patient to take 2-30 mg tablets until new prescription picked up.

## 2021-02-02 ENCOUNTER — TELEPHONE (OUTPATIENT)
Dept: PRIMARY CARE CLINIC | Age: 66
End: 2021-02-02

## 2021-02-02 DIAGNOSIS — G47.00 INSOMNIA, UNSPECIFIED TYPE: ICD-10-CM

## 2021-02-02 DIAGNOSIS — Z76.0 MEDICATION REFILL: ICD-10-CM

## 2021-02-02 NOTE — TELEPHONE ENCOUNTER
Patient called to inform PCP about medication refills Sildenafil 50 mg and Zolpidem 12.5 the qty received from pharmacy was cut in half due to insurance purposes.  Patient is requesting that office call insurance company to give verbal authorization on full qty approval   # [phone],[place] advise

## 2021-02-03 RX ORDER — ZOLPIDEM TARTRATE 12.5 MG/1
12.5 TABLET, FILM COATED, EXTENDED RELEASE ORAL NIGHTLY
Qty: 30 TABLET | Refills: 0 | Status: SHIPPED | OUTPATIENT
Start: 2021-02-25 | End: 2021-03-27

## 2021-02-03 NOTE — TELEPHONE ENCOUNTER
Contacted pharmacy. Staff member states wouldn't know. Insurance provider would have to be contacted.

## 2021-02-03 NOTE — TELEPHONE ENCOUNTER
Spoke with staff at [id number]. Stated per insurance, will only cover 6 tablets of Sildenafil 50 mg per 25 days, 13 tablets of Zolpidem 12.5 per 25 days.

## 2021-03-01 ENCOUNTER — TELEPHONE (OUTPATIENT)
Dept: PRIMARY CARE CLINIC | Age: 66
End: 2021-03-01

## 2021-03-01 NOTE — LETTER
[facility]  [address] [place] [id number]  Phone: [phone]  Fax: [phone]    Ida Roque PA-C        March 1, 2021     [address] [employer] [address] [address]      Dear Berta Hernandez: This letter is a reminder that your LABS tests are due. If you've already completed your labs, please disregard this notice.       Sincerely,        Ida Roque PA-C

## 2021-03-22 ENCOUNTER — HOSPITAL ENCOUNTER (OUTPATIENT)
Age: 66
Discharge: HOME | End: 2021-03-22
Payer: COMMERCIAL

## 2021-03-22 DIAGNOSIS — Z00.00 HEALTHCARE MAINTENANCE: ICD-10-CM

## 2021-03-22 LAB
ALBUMIN SERPL-MCNC: 4.4 G/DL (ref 3.5–5.2)
ALBUMIN/GLOBULIN RATIO: 1.8 (ref 1–2.5)
ALP BLD-CCNC: 88 U/L (ref 40–129)
ALT SERPL-CCNC: 19 U/L (ref 5–41)
ANION GAP SERPL CALCULATED.3IONS-SCNC: 8 MMOL/L (ref 9–17)
AST SERPL-CCNC: 18 U/L
BILIRUB SERPL-MCNC: 0.69 MG/DL (ref 0.3–1.2)
BUN BLDV-MCNC: 13 MG/DL (ref 8–23)
BUN/CREAT BLD: ABNORMAL (ref 9–20)
CALCIUM SERPL-MCNC: 9.5 MG/DL (ref 8.6–10.4)
CHLORIDE BLD-SCNC: 104 MMOL/L (ref 98–107)
CHOLESTEROL, FASTING: 219 MG/DL
CHOLESTEROL/HDL RATIO: 4.6
CO2: 26 MMOL/L (ref 20–31)
CREAT SERPL-MCNC: 0.96 MG/DL (ref 0.7–1.2)
CREATININE URINE: 130.5 MG/DL (ref 39–259)
ESTIMATED AVERAGE GLUCOSE: 131 MG/DL
GFR AFRICAN AMERICAN: >60 ML/MIN
GFR NON-AFRICAN AMERICAN: >60 ML/MIN
GFR SERPL CREATININE-BSD FRML MDRD: ABNORMAL ML/MIN/{1.73_M2}
GFR SERPL CREATININE-BSD FRML MDRD: ABNORMAL ML/MIN/{1.73_M2}
GLUCOSE BLD-MCNC: 156 MG/DL (ref 70–99)
HBA1C MFR BLD: 6.2 % (ref 4–6)
HCT VFR BLD CALC: 40.5 % (ref 40.7–50.3)
HDLC SERPL-MCNC: 48 MG/DL
HEMOGLOBIN: 13.2 G/DL (ref 13–17)
LDL CHOLESTEROL: 153 MG/DL (ref 0–130)
MCH RBC QN AUTO: 27.8 PG (ref 25.2–33.5)
MCHC RBC AUTO-ENTMCNC: 32.6 G/DL (ref 28.4–34.8)
MCV RBC AUTO: 85.4 FL (ref 82.6–102.9)
MICROALBUMIN/CREAT 24H UR: <12 MG/L
MICROALBUMIN/CREAT UR-RTO: NORMAL MCG/MG CREAT
NRBC AUTOMATED: 0 PER 100 WBC
PDW BLD-RTO: 12.1 % (ref 11.8–14.4)
PLATELET # BLD: 262 K/UL (ref 138–453)
PMV BLD AUTO: 10.6 FL (ref 8.1–13.5)
POTASSIUM SERPL-SCNC: 4.2 MMOL/L (ref 3.7–5.3)
RBC # BLD: 4.74 M/UL (ref 4.21–5.77)
SODIUM BLD-SCNC: 138 MMOL/L (ref 135–144)
TOTAL PROTEIN: 6.9 G/DL (ref 6.4–8.3)
TRIGLYCERIDE, FASTING: 91 MG/DL
VLDLC SERPL CALC-MCNC: ABNORMAL MG/DL (ref 1–30)
WBC # BLD: 6.6 K/UL (ref 3.5–11.3)

## 2021-03-22 PROCEDURE — 82043 UR ALBUMIN QUANTITATIVE: CPT

## 2021-03-22 PROCEDURE — 85027 COMPLETE CBC AUTOMATED: CPT

## 2021-03-22 PROCEDURE — 80061 LIPID PANEL: CPT

## 2021-03-22 PROCEDURE — 83036 HEMOGLOBIN GLYCOSYLATED A1C: CPT

## 2021-03-22 PROCEDURE — 36415 COLL VENOUS BLD VENIPUNCTURE: CPT

## 2021-03-22 PROCEDURE — 80053 COMPREHEN METABOLIC PANEL: CPT

## 2021-03-22 PROCEDURE — 82570 ASSAY OF URINE CREATININE: CPT

## 2021-03-31 DIAGNOSIS — I10 ESSENTIAL HYPERTENSION: ICD-10-CM

## 2021-03-31 RX ORDER — LISINOPRIL 40 MG/1
TABLET ORAL
Qty: 90 TABLET | Refills: 0 | Status: SHIPPED | OUTPATIENT
Start: 2021-03-31 | End: 2021-11-22 | Stop reason: SDUPTHER

## 2021-03-31 NOTE — TELEPHONE ENCOUNTER
Health Maintenance   Topic Date Due    COVID-19 Vaccine (1) Never done    DTaP/Tdap/Td vaccine (1 - Tdap) Never done    Shingles Vaccine (1 of 2) Never done    Diabetic foot exam  10/30/2016    Diabetic retinal exam  03/30/2019    Flu vaccine (1) 09/01/2020    A1C test (Diabetic or Prediabetic)  03/22/2022    Diabetic microalbuminuria test  03/22/2022    Lipid screen  03/22/2022    Potassium monitoring  03/22/2022    Creatinine monitoring  03/22/2022    Pneumococcal 65+ years Vaccine (1 of 1 - PPSV23) 04/18/2022    Colon cancer screen colonoscopy  12/02/2029    AAA screen  Completed    Hepatitis C screen  Completed    Hepatitis A vaccine  Aged Out    Hib vaccine  Aged Out    Meningococcal (ACWY) vaccine  Aged Out             (applicable per patient's age: Cancer Screenings, Depression Screening, Fall Risk Screening, Immunizations)    Hemoglobin A1C (%)   Date Value   03/22/2021 6.2 (H)   01/06/2020 5.7   10/09/2019 5.8     Microalb/Crt. Ratio (mcg/mg creat)   Date Value   03/22/2021 CANNOT BE CALCULATED     LDL Cholesterol (mg/dL)   Date Value   03/22/2021 153 (H)     AST (U/L)   Date Value   03/22/2021 18     ALT (U/L)   Date Value   03/22/2021 19     BUN (mg/dL)   Date Value   03/22/2021 13      (goal A1C is < 7)   (goal LDL is <100) need 30-50% reduction from baseline     BP Readings from Last 3 Encounters:   01/26/21 (!) 176/86   06/04/20 (!) 154/76   05/21/20 (!) 170/96    (goal /80)      All Future Testing planned in [facility]:  Lab Frequency Next Occurrence       Next Visit Date:  No future appointments.          Patient Active Problem List:     Chronic pain syndrome     DJD (degenerative joint disease), thoracic     Obesity     ED (erectile dysfunction)     Neuropathy     DJD (degenerative joint disease), lumbosacral     Anemia     Sleep disorder     Internal derangement of knee joint     Unstable gait     Type 2 diabetes mellitus without complication (HCC)     Spondylosis of cervical region without myelopathy or radiculopathy     Essential hypertension     Mixed hyperlipidemia     Complete tear of right rotator cuff     Obstructive rhinitis     Closed trimalleolar fracture of right ankle     Fatigue due to exposure     Gastroesophageal reflux disease     Tear of right supraspinatus tendon     Tear of infraspinatus tendon, right, initial encounter     Tendinosis of right shoulder     Colon polyp

## 2021-06-15 ENCOUNTER — OFFICE VISIT (OUTPATIENT)
Dept: PRIMARY CARE CLINIC | Age: 66
End: 2021-06-15
Payer: COMMERCIAL

## 2021-06-15 VITALS
HEART RATE: 78 BPM | OXYGEN SATURATION: 97 % | DIASTOLIC BLOOD PRESSURE: 110 MMHG | WEIGHT: 216 LBS | SYSTOLIC BLOOD PRESSURE: 169 MMHG | BODY MASS INDEX: 29.26 KG/M2 | TEMPERATURE: 97.4 F | HEIGHT: 72 IN

## 2021-06-15 DIAGNOSIS — R73.9 HYPERGLYCEMIA: ICD-10-CM

## 2021-06-15 DIAGNOSIS — Z76.0 MEDICATION REFILL: ICD-10-CM

## 2021-06-15 PROCEDURE — 1036F TOBACCO NON-USER: CPT | Performed by: INTERNAL MEDICINE

## 2021-06-15 PROCEDURE — 4040F PNEUMOC VAC/ADMIN/RCVD: CPT | Performed by: INTERNAL MEDICINE

## 2021-06-15 PROCEDURE — G8417 CALC BMI ABV UP PARAM F/U: HCPCS | Performed by: INTERNAL MEDICINE

## 2021-06-15 PROCEDURE — 99213 OFFICE O/P EST LOW 20 MIN: CPT | Performed by: INTERNAL MEDICINE

## 2021-06-15 PROCEDURE — G8427 DOCREV CUR MEDS BY ELIG CLIN: HCPCS | Performed by: INTERNAL MEDICINE

## 2021-06-15 PROCEDURE — 3017F COLORECTAL CA SCREEN DOC REV: CPT | Performed by: INTERNAL MEDICINE

## 2021-06-15 PROCEDURE — 1123F ACP DISCUSS/DSCN MKR DOCD: CPT | Performed by: INTERNAL MEDICINE

## 2021-06-15 SDOH — ECONOMIC STABILITY: FOOD INSECURITY: WITHIN THE PAST 12 MONTHS, THE FOOD YOU BOUGHT JUST DIDN'T LAST AND YOU DIDN'T HAVE MONEY TO GET MORE.: NEVER TRUE

## 2021-06-15 SDOH — ECONOMIC STABILITY: FOOD INSECURITY: WITHIN THE PAST 12 MONTHS, YOU WORRIED THAT YOUR FOOD WOULD RUN OUT BEFORE YOU GOT MONEY TO BUY MORE.: NEVER TRUE

## 2021-06-15 ASSESSMENT — SOCIAL DETERMINANTS OF HEALTH (SDOH): HOW HARD IS IT FOR YOU TO PAY FOR THE VERY BASICS LIKE FOOD, HOUSING, MEDICAL CARE, AND HEATING?: NOT HARD AT ALL
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
## 2021-06-15 NOTE — PROGRESS NOTES
Visit Information    Have you changed or started any medications since your last visit including any over-the-counter medicines, vitamins, or herbal medicines? no   Are you having any side effects from any of your medications? -  no  Have you stopped taking any of your medications? Is so, why? -  no    Have you seen any other physician or provider since your last visit? No  Have you had any other diagnostic tests since your last visit? No  Have you been seen in the emergency room and/or had an admission to a hospital since we last saw you? No  Have you had your routine dental cleaning in the past 6 months? no    Have you activated your [employer] account? If not, what are your barriers?  Yes     Patient Care Team:  Marisol Blanco PA-C as PCP - General    Medical History Review  Past Medical, Family, and Social History reviewed and does not contribute to the patient presenting condition    Health Maintenance   Topic Date Due    COVID-19 Vaccine (1) Never done    DTaP/Tdap/Td vaccine (1 - Tdap) Never done    Shingles Vaccine (1 of 2) Never done    Diabetic foot exam  10/30/2016    Diabetic retinal exam  03/30/2019    Flu vaccine (Season Ended) 09/01/2021    A1C test (Diabetic or Prediabetic)  03/22/2022    Diabetic microalbuminuria test  03/22/2022    Lipid screen  03/22/2022    Potassium monitoring  03/22/2022    Creatinine monitoring  03/22/2022    Pneumococcal 65+ years Vaccine (1 of 1 - PPSV23) 04/18/2022    Colon cancer screen colonoscopy  12/02/2029    AAA screen  Completed    Hepatitis C screen  Completed    Hepatitis A vaccine  Aged Out    Hib vaccine  Aged Out    Meningococcal (ACWY) vaccine  Aged Out

## 2021-11-18 ENCOUNTER — OFFICE VISIT (OUTPATIENT)
Dept: FAMILY MEDICINE CLINIC | Age: 66
End: 2021-11-18
Payer: COMMERCIAL

## 2021-11-18 VITALS
WEIGHT: 216 LBS | HEART RATE: 93 BPM | OXYGEN SATURATION: 99 % | BODY MASS INDEX: 29.29 KG/M2 | TEMPERATURE: 97.2 F | SYSTOLIC BLOOD PRESSURE: 164 MMHG | DIASTOLIC BLOOD PRESSURE: 86 MMHG

## 2021-11-18 DIAGNOSIS — E11.9 TYPE 2 DIABETES MELLITUS WITHOUT COMPLICATION, WITHOUT LONG-TERM CURRENT USE OF INSULIN (HCC): Primary | ICD-10-CM

## 2021-11-18 DIAGNOSIS — R73.9 HYPERGLYCEMIA: ICD-10-CM

## 2021-11-18 DIAGNOSIS — I10 ESSENTIAL HYPERTENSION: ICD-10-CM

## 2021-11-18 DIAGNOSIS — Z76.0 MEDICATION REFILL: ICD-10-CM

## 2021-11-18 LAB — HBA1C MFR BLD: 5.6 %

## 2021-11-18 PROCEDURE — 99203 OFFICE O/P NEW LOW 30 MIN: CPT | Performed by: STUDENT IN AN ORGANIZED HEALTH CARE EDUCATION/TRAINING PROGRAM

## 2021-11-18 PROCEDURE — 83036 HEMOGLOBIN GLYCOSYLATED A1C: CPT | Performed by: STUDENT IN AN ORGANIZED HEALTH CARE EDUCATION/TRAINING PROGRAM

## 2021-11-18 ASSESSMENT — PATIENT HEALTH QUESTIONNAIRE - PHQ9
SUM OF ALL RESPONSES TO PHQ9 QUESTIONS 1 & 2: 0
2. FEELING DOWN, DEPRESSED OR HOPELESS: 0
SUM OF ALL RESPONSES TO PHQ QUESTIONS 1-9: 0
1. LITTLE INTEREST OR PLEASURE IN DOING THINGS: 0
SUM OF ALL RESPONSES TO PHQ QUESTIONS 1-9: 0
SUM OF ALL RESPONSES TO PHQ QUESTIONS 1-9: 0

## 2021-11-19 ASSESSMENT — ENCOUNTER SYMPTOMS
COLOR CHANGE: 0
VOMITING: 0
TROUBLE SWALLOWING: 0
ABDOMINAL PAIN: 0
NAUSEA: 0

## 2021-11-19 NOTE — PROGRESS NOTES
Subjective:  An Palmer presents for   Chief Complaint   Patient presents with    New Patient       Here to establish care as new patient. Past medical history of diabetes and hypertension. He needs medication refills and states that he will need to go home and call back regarding which ones he needs to refill. Blood pressure elevated in clinic due to not having taken his meds for some time. Patient Active Problem List   Diagnosis    Chronic pain syndrome    DJD (degenerative joint disease), thoracic    Obesity    ED (erectile dysfunction)    Neuropathy    DJD (degenerative joint disease), lumbosacral    Anemia    Sleep disorder    Internal derangement of knee joint    Unstable gait    Type 2 diabetes mellitus without complication (HCC)    Spondylosis of cervical region without myelopathy or radiculopathy    Essential hypertension    Mixed hyperlipidemia    Complete tear of right rotator cuff    Obstructive rhinitis    Closed trimalleolar fracture of right ankle    Fatigue due to exposure    Gastroesophageal reflux disease    Tear of right supraspinatus tendon    Tear of infraspinatus tendon, right, initial encounter    Tendinosis of right shoulder    Colon polyp         Review of Systems   Constitutional: Negative for appetite change, chills, fatigue and fever. HENT: Negative for congestion and trouble swallowing. Cardiovascular: Negative. Gastrointestinal: Negative for abdominal pain, nausea and vomiting. Genitourinary: Negative for difficulty urinating, dysuria and flank pain. Musculoskeletal: Negative for arthralgias. Skin: Negative for color change. Neurological: Negative for dizziness and headaches. Psychiatric/Behavioral: Negative for behavioral problems and confusion. The patient is not nervous/anxious.          Objective:  Physical Exam   Vitals:   Vitals:    11/18/21 1632 11/18/21 1659   BP: (!) 172/94 (!) 164/86   Site: Left Upper Arm Left Upper Arm   Position: of sensation is + for neuropathy)    Plus at least one of the following:  Pulses: normal,   Pinprick: Intact  Proprioception: Intact  Vibration (128 Hz): Intact    Essential HTN: Elevated blood pressure due to not having been on meds. Will refill his medications of lisinopril 40 mg. No complaints or issues. Receives his pain meds from pain management.

## 2021-11-22 DIAGNOSIS — Z76.0 MEDICATION REFILL: ICD-10-CM

## 2021-11-22 DIAGNOSIS — I10 ESSENTIAL HYPERTENSION: ICD-10-CM

## 2021-11-22 NOTE — TELEPHONE ENCOUNTER
Jose Frost is calling to request a refill on the following medication(s):    Medication Request:  Requested Prescriptions     Pending Prescriptions Disp Refills    lisinopril (PRINIVIL;ZESTRIL) 40 MG tablet 90 tablet 0     Sig: TAKE 1 TABLET BY MOUTH EVERY DAY    NIFEdipine (ADALAT CC) 60 MG extended release tablet 90 tablet 1     Sig: Take 1 tablet by mouth daily       Last Visit Date (If Applicable):  15/82/9280    Next Visit Date:    12/15/2021

## 2021-11-23 RX ORDER — NIFEDIPINE 60 MG/1
60 TABLET, FILM COATED, EXTENDED RELEASE ORAL DAILY
Qty: 90 TABLET | Refills: 1 | Status: SHIPPED | OUTPATIENT
Start: 2021-11-23

## 2021-11-23 RX ORDER — LISINOPRIL 40 MG/1
TABLET ORAL
Qty: 90 TABLET | Refills: 0 | Status: SHIPPED | OUTPATIENT
Start: 2021-11-23

## 2021-11-26 DIAGNOSIS — G47.9 SLEEP DISORDER: Primary | ICD-10-CM

## 2021-11-26 RX ORDER — ZOLPIDEM TARTRATE 5 MG/1
5 TABLET ORAL NIGHTLY PRN
Qty: 30 TABLET | Refills: 1 | Status: SHIPPED | OUTPATIENT
Start: 2021-11-26 | End: 2021-12-10

## 2021-11-26 NOTE — TELEPHONE ENCOUNTER
Tuan Cramer is calling to request a refill on the following medication(s):    Medication Request:  Requested Prescriptions     Pending Prescriptions Disp Refills    zolpidem (AMBIEN) 5 MG tablet       Sig: Take by mouth.  Patient does not know dosage       Last Visit Date (If Applicable):  79/67/5476 In office    Next Visit Date:    12/15/2021

## 2021-12-15 ENCOUNTER — OFFICE VISIT (OUTPATIENT)
Dept: FAMILY MEDICINE CLINIC | Age: 66
End: 2021-12-15
Payer: COMMERCIAL

## 2021-12-15 VITALS
WEIGHT: 213 LBS | TEMPERATURE: 97.9 F | SYSTOLIC BLOOD PRESSURE: 182 MMHG | HEART RATE: 67 BPM | OXYGEN SATURATION: 96 % | DIASTOLIC BLOOD PRESSURE: 86 MMHG | BODY MASS INDEX: 28.89 KG/M2

## 2021-12-15 DIAGNOSIS — G47.09 OTHER INSOMNIA: ICD-10-CM

## 2021-12-15 DIAGNOSIS — I10 ESSENTIAL HYPERTENSION: Primary | ICD-10-CM

## 2021-12-15 DIAGNOSIS — L29.9 EAR ITCHING: ICD-10-CM

## 2021-12-15 DIAGNOSIS — L30.8 OTHER ECZEMA: ICD-10-CM

## 2021-12-15 PROCEDURE — 1123F ACP DISCUSS/DSCN MKR DOCD: CPT | Performed by: STUDENT IN AN ORGANIZED HEALTH CARE EDUCATION/TRAINING PROGRAM

## 2021-12-15 PROCEDURE — 4040F PNEUMOC VAC/ADMIN/RCVD: CPT | Performed by: STUDENT IN AN ORGANIZED HEALTH CARE EDUCATION/TRAINING PROGRAM

## 2021-12-15 PROCEDURE — G8482 FLU IMMUNIZE ORDER/ADMIN: HCPCS | Performed by: STUDENT IN AN ORGANIZED HEALTH CARE EDUCATION/TRAINING PROGRAM

## 2021-12-15 PROCEDURE — 3017F COLORECTAL CA SCREEN DOC REV: CPT | Performed by: STUDENT IN AN ORGANIZED HEALTH CARE EDUCATION/TRAINING PROGRAM

## 2021-12-15 PROCEDURE — 99213 OFFICE O/P EST LOW 20 MIN: CPT | Performed by: STUDENT IN AN ORGANIZED HEALTH CARE EDUCATION/TRAINING PROGRAM

## 2021-12-15 PROCEDURE — G8427 DOCREV CUR MEDS BY ELIG CLIN: HCPCS | Performed by: STUDENT IN AN ORGANIZED HEALTH CARE EDUCATION/TRAINING PROGRAM

## 2021-12-15 PROCEDURE — 1036F TOBACCO NON-USER: CPT | Performed by: STUDENT IN AN ORGANIZED HEALTH CARE EDUCATION/TRAINING PROGRAM

## 2021-12-15 PROCEDURE — G8417 CALC BMI ABV UP PARAM F/U: HCPCS | Performed by: STUDENT IN AN ORGANIZED HEALTH CARE EDUCATION/TRAINING PROGRAM

## 2021-12-15 RX ORDER — ZOLPIDEM TARTRATE 12.5 MG/1
12.5 TABLET, FILM COATED, EXTENDED RELEASE ORAL NIGHTLY PRN
COMMUNITY
End: 2021-12-15 | Stop reason: SDUPTHER

## 2021-12-15 RX ORDER — ZOLPIDEM TARTRATE 12.5 MG/1
12.5 TABLET, FILM COATED, EXTENDED RELEASE ORAL NIGHTLY PRN
Qty: 30 TABLET | Refills: 1 | Status: SHIPPED | OUTPATIENT
Start: 2021-12-15 | End: 2021-12-15

## 2021-12-15 ASSESSMENT — PATIENT HEALTH QUESTIONNAIRE - PHQ9
SUM OF ALL RESPONSES TO PHQ QUESTIONS 1-9: 0
SUM OF ALL RESPONSES TO PHQ9 QUESTIONS 1 & 2: 0
SUM OF ALL RESPONSES TO PHQ QUESTIONS 1-9: 0
SUM OF ALL RESPONSES TO PHQ QUESTIONS 1-9: 0
1. LITTLE INTEREST OR PLEASURE IN DOING THINGS: 0
2. FEELING DOWN, DEPRESSED OR HOPELESS: 0

## 2021-12-15 NOTE — PROGRESS NOTES
Subjective:  Judd Davis presents for   Chief Complaint   Patient presents with    Hypertension       Here for follow-up on hypertension. He has not taken his medications due to confusion with the pharmacy. His levels are elevated. He has no symptoms however. Patient Active Problem List   Diagnosis    Chronic pain syndrome    DJD (degenerative joint disease), thoracic    Obesity    ED (erectile dysfunction)    Neuropathy    DJD (degenerative joint disease), lumbosacral    Anemia    Sleep disorder    Internal derangement of knee joint    Unstable gait    Type 2 diabetes mellitus without complication (HCC)    Spondylosis of cervical region without myelopathy or radiculopathy    Essential hypertension    Mixed hyperlipidemia    Complete tear of right rotator cuff    Obstructive rhinitis    Closed trimalleolar fracture of right ankle    Fatigue due to exposure    Gastroesophageal reflux disease    Tear of right supraspinatus tendon    Tear of infraspinatus tendon, right, initial encounter    Tendinosis of right shoulder    Colon polyp         Review of Systems   Constitutional: Negative for appetite change, chills, fatigue and fever. HENT: Negative for congestion and trouble swallowing. Cardiovascular: Negative. Gastrointestinal: Negative for abdominal pain, nausea and vomiting. Genitourinary: Negative for difficulty urinating, dysuria and flank pain. Musculoskeletal: Negative for arthralgias. Skin: Negative for color change. Neurological: Negative for dizziness and headaches. Psychiatric/Behavioral: Negative for behavioral problems and confusion. The patient is not nervous/anxious.          Objective:  Physical Exam   Vitals:   Vitals:    12/15/21 1629 12/15/21 1652   BP: (!) 178/90 (!) 182/86   Site: Left Upper Arm Left Upper Arm   Position: Sitting Sitting   Cuff Size: Medium Adult Large Adult   Pulse: 67    Temp: 97.9 °F (36.6 °C)    TempSrc: Temporal    SpO2: 96% Weight: 213 lb (96.6 kg)      Wt Readings from Last 3 Encounters:   12/15/21 213 lb (96.6 kg)   11/18/21 216 lb (98 kg)   06/15/21 216 lb (98 kg)     Ht Readings from Last 3 Encounters:   06/15/21 6' (1.829 m)   06/09/20 6' (1.829 m)   06/04/20 6' (1.829 m)     Body mass index is 28.89 kg/m². Physical Exam  Vitals reviewed. Constitutional:       General: He is not in acute distress. Appearance: Normal appearance. HENT:      Mouth/Throat:      Mouth: Mucous membranes are moist.   Eyes:      Conjunctiva/sclera: Conjunctivae normal.   Cardiovascular:      Rate and Rhythm: Normal rate and regular rhythm. Heart sounds: Normal heart sounds. Pulmonary:      Effort: Pulmonary effort is normal.      Breath sounds: Normal breath sounds. Abdominal:      General: Abdomen is flat. Bowel sounds are normal.      Palpations: Abdomen is soft. Skin:     General: Skin is warm and dry. Neurological:      Mental Status: He is alert and oriented to person, place, and time. Psychiatric:         Mood and Affect: Mood normal.            Assessment/Plan:    Diagnosis Orders   1. Essential hypertension     2. Other insomnia  DISCONTINUED: zolpidem (AMBIEN CR) 12.5 MG extended release tablet   3. Ear itching  fluocinonide (LIDEX) 0.05 % cream   4. Other eczema          Return in about 4 weeks (around 1/12/2022) for HTN. I did urge him to start taking his medications and measure his blood pressure logs and bring this in next time at his 1 month visit. Elevated pressures. He does not have any symptoms of headache, blurry vision. He needs fluocinonide cream for his skin condition near his ear. Most likely related to eczema.

## 2021-12-16 ASSESSMENT — ENCOUNTER SYMPTOMS
TROUBLE SWALLOWING: 0
NAUSEA: 0
VOMITING: 0
ABDOMINAL PAIN: 0
COLOR CHANGE: 0

## 2022-02-01 ENCOUNTER — OFFICE VISIT (OUTPATIENT)
Dept: FAMILY MEDICINE CLINIC | Age: 67
End: 2022-02-01
Payer: COMMERCIAL

## 2022-02-01 VITALS
DIASTOLIC BLOOD PRESSURE: 98 MMHG | OXYGEN SATURATION: 99 % | BODY MASS INDEX: 29.29 KG/M2 | WEIGHT: 216 LBS | HEART RATE: 76 BPM | SYSTOLIC BLOOD PRESSURE: 174 MMHG | TEMPERATURE: 98.1 F

## 2022-02-01 DIAGNOSIS — I10 ESSENTIAL HYPERTENSION: ICD-10-CM

## 2022-02-01 DIAGNOSIS — N40.1 BENIGN PROSTATIC HYPERPLASIA WITH NOCTURIA: Primary | ICD-10-CM

## 2022-02-01 DIAGNOSIS — R35.1 BENIGN PROSTATIC HYPERPLASIA WITH NOCTURIA: Primary | ICD-10-CM

## 2022-02-01 PROCEDURE — 4040F PNEUMOC VAC/ADMIN/RCVD: CPT | Performed by: STUDENT IN AN ORGANIZED HEALTH CARE EDUCATION/TRAINING PROGRAM

## 2022-02-01 PROCEDURE — G8482 FLU IMMUNIZE ORDER/ADMIN: HCPCS | Performed by: STUDENT IN AN ORGANIZED HEALTH CARE EDUCATION/TRAINING PROGRAM

## 2022-02-01 PROCEDURE — 3017F COLORECTAL CA SCREEN DOC REV: CPT | Performed by: STUDENT IN AN ORGANIZED HEALTH CARE EDUCATION/TRAINING PROGRAM

## 2022-02-01 PROCEDURE — 1123F ACP DISCUSS/DSCN MKR DOCD: CPT | Performed by: STUDENT IN AN ORGANIZED HEALTH CARE EDUCATION/TRAINING PROGRAM

## 2022-02-01 PROCEDURE — G8417 CALC BMI ABV UP PARAM F/U: HCPCS | Performed by: STUDENT IN AN ORGANIZED HEALTH CARE EDUCATION/TRAINING PROGRAM

## 2022-02-01 PROCEDURE — 99214 OFFICE O/P EST MOD 30 MIN: CPT | Performed by: STUDENT IN AN ORGANIZED HEALTH CARE EDUCATION/TRAINING PROGRAM

## 2022-02-01 PROCEDURE — G8427 DOCREV CUR MEDS BY ELIG CLIN: HCPCS | Performed by: STUDENT IN AN ORGANIZED HEALTH CARE EDUCATION/TRAINING PROGRAM

## 2022-02-01 PROCEDURE — 1036F TOBACCO NON-USER: CPT | Performed by: STUDENT IN AN ORGANIZED HEALTH CARE EDUCATION/TRAINING PROGRAM

## 2022-02-01 RX ORDER — CHLORTHALIDONE 25 MG/1
25 TABLET ORAL DAILY
Qty: 30 TABLET | Refills: 11 | Status: SHIPPED | OUTPATIENT
Start: 2022-02-01 | End: 2022-02-01

## 2022-02-01 RX ORDER — TAMSULOSIN HYDROCHLORIDE 0.4 MG/1
0.4 CAPSULE ORAL DAILY
Qty: 30 CAPSULE | Refills: 2 | Status: SHIPPED | OUTPATIENT
Start: 2022-02-01 | End: 2022-02-24

## 2022-02-01 ASSESSMENT — PATIENT HEALTH QUESTIONNAIRE - PHQ9
SUM OF ALL RESPONSES TO PHQ QUESTIONS 1-9: 0
1. LITTLE INTEREST OR PLEASURE IN DOING THINGS: 0
SUM OF ALL RESPONSES TO PHQ QUESTIONS 1-9: 0
SUM OF ALL RESPONSES TO PHQ QUESTIONS 1-9: 0
SUM OF ALL RESPONSES TO PHQ9 QUESTIONS 1 & 2: 0
2. FEELING DOWN, DEPRESSED OR HOPELESS: 0
SUM OF ALL RESPONSES TO PHQ QUESTIONS 1-9: 0

## 2022-02-01 NOTE — LETTER
[facility]  [address] [address]  Phone: [phone]  Fax: [phone]    Jenny Sagastume MD        February 1, 2022     Patient: Veronique Antony   YOB: 1955   Date of Visit: 2/1/2022       To Whom It May Concern:    After evaluation in the clinic, Marcella Echeverria will be starting a medication that will cause him to urinate often. Please excuse t is my medical opinion that Johnny Menjivar {Work release (duty restriction):55051}. If you have any questions or concerns, please don't hesitate to call.     Sincerely,        Jenny Sagastume MD

## 2022-02-01 NOTE — PROGRESS NOTES
Subjective:  Orly Herrera presents for   Chief Complaint   Patient presents with    1 Month Follow-Up     hypertension       HPI   Here for 1 month follow-up on blood pressure. He has not brought his logs from home but states he averages around 150s/80s. He is currently on lisinopril 40 mg and nifedipine 60 mg. States he takes lisinopril 20 mg and nifedipine 40 mg and not as prescribed. States he was not aware of the change. Has been having nocturia symptoms. Patient Active Problem List   Diagnosis    Chronic pain syndrome    DJD (degenerative joint disease), thoracic    Obesity    ED (erectile dysfunction)    Neuropathy    DJD (degenerative joint disease), lumbosacral    Anemia    Sleep disorder    Internal derangement of knee joint    Unstable gait    Type 2 diabetes mellitus without complication (HCC)    Spondylosis of cervical region without myelopathy or radiculopathy    Essential hypertension    Mixed hyperlipidemia    Complete tear of right rotator cuff    Obstructive rhinitis    Closed trimalleolar fracture of right ankle    Fatigue due to exposure    Gastroesophageal reflux disease    Tear of right supraspinatus tendon    Tear of infraspinatus tendon, right, initial encounter    Tendinosis of right shoulder    Colon polyp         Review of Systems   Constitutional: Negative for appetite change, chills, fatigue and fever. HENT: Negative for congestion and trouble swallowing. Cardiovascular: Negative. Gastrointestinal: Negative for abdominal pain, nausea and vomiting. Genitourinary: Positive for difficulty urinating and frequency. Negative for dysuria and flank pain. Musculoskeletal: Negative for arthralgias. Skin: Negative for color change. Neurological: Negative for dizziness and headaches. Psychiatric/Behavioral: Negative for behavioral problems and confusion. The patient is not nervous/anxious.          Objective:  Physical Exam   Vitals:   Vitals:    02/01/22 1631 02/01/22 1650   BP: (!) 162/84 (!) 174/98   Site: Left Upper Arm Left Upper Arm   Position: Sitting Sitting   Cuff Size: Medium Adult Medium Adult   Pulse: 76    Temp: 98.1 °F (36.7 °C)    TempSrc: Temporal    SpO2: 99%    Weight: 216 lb (98 kg)      Wt Readings from Last 3 Encounters:   02/01/22 216 lb (98 kg)   12/15/21 213 lb (96.6 kg)   11/18/21 216 lb (98 kg)     Ht Readings from Last 3 Encounters:   06/15/21 6' (1.829 m)   06/09/20 6' (1.829 m)   06/04/20 6' (1.829 m)     Body mass index is 29.29 kg/m². Physical Exam  Vitals reviewed. Constitutional:       General: He is not in acute distress. Appearance: Normal appearance. HENT:      Mouth/Throat:      Mouth: Mucous membranes are moist.   Eyes:      Conjunctiva/sclera: Conjunctivae normal.   Cardiovascular:      Rate and Rhythm: Normal rate and regular rhythm. Heart sounds: Normal heart sounds. Skin:     General: Skin is warm and dry. Neurological:      Mental Status: He is alert and oriented to person, place, and time. Psychiatric:         Mood and Affect: Mood normal.            Assessment/Plan:    Diagnosis Orders   1. Benign prostatic hyperplasia with nocturia  tamsulosin (FLOMAX) 0.4 MG capsule   2. Essential hypertension  DISCONTINUED: chlorthalidone (HYGROTON) 25 MG tablet        Return in about 3 months (around 5/1/2022) for HTN. Hypertensioncurrently elevated blood pressure in the office and at home. He is not at goal.  Advised him to take lisinopril as 40 mg and nifedipine as 60 mg instead of his previous orders. Voiced understanding. BPHnocturia symptoms most likely related to benign prostatic hyperplasia. We will start him on tamsulosin.

## 2022-02-02 ASSESSMENT — ENCOUNTER SYMPTOMS
COLOR CHANGE: 0
VOMITING: 0
ABDOMINAL PAIN: 0
NAUSEA: 0
TROUBLE SWALLOWING: 0

## 2022-02-16 ENCOUNTER — TELEPHONE (OUTPATIENT)
Dept: FAMILY MEDICINE CLINIC | Age: 67
End: 2022-02-16

## 2022-02-16 DIAGNOSIS — B96.89 ACUTE BACTERIAL SINUSITIS: Primary | ICD-10-CM

## 2022-02-16 DIAGNOSIS — J01.90 ACUTE BACTERIAL SINUSITIS: Primary | ICD-10-CM

## 2022-02-16 RX ORDER — AMOXICILLIN AND CLAVULANATE POTASSIUM 875; 125 MG/1; MG/1
1 TABLET, FILM COATED ORAL 2 TIMES DAILY
Qty: 14 TABLET | Refills: 0 | Status: SHIPPED | OUTPATIENT
Start: 2022-02-16 | End: 2022-02-23

## 2022-02-16 NOTE — TELEPHONE ENCOUNTER
He wants the amoxicillen for a sinus infection. He says it should be in his chart from previous doctor. He says it is affecting his teeth, and hurts bad. He says he cannot even sleep. Symptoms:  Teeth hurt, glands swollen, runny nose, face is swollen, had a headache, please advise patient  [phone].  ashlee on [employer] and [employer]

## 2022-02-16 NOTE — TELEPHONE ENCOUNTER
----- Message from Dior sent at 2/15/2022  4:44 PM EST -----  Subject: Message to Provider    QUESTIONS  Information for Provider? pt is asking to be prescribe amoxicillin send to   [facility] on [address]. pt says that he is in pain and in dire need of   the antibiotic. he is home at 4:20pm, if the office needs to call him he   wanted me to include when he would be home from work.   ---------------------------------------------------------------------------  --------------  [address]  What is the best way for the office to contact you? OK to leave message on   voicemail  Preferred Call Back Phone Number? [phone]  ---------------------------------------------------------------------------  --------------  SCRIPT ANSWERS  Relationship to Patient?  Self

## 2022-02-16 NOTE — TELEPHONE ENCOUNTER
Left message for patient that medication was called in and if symptoms get worse to be seen in ED.  And to call office with any questions

## 2022-02-24 DIAGNOSIS — N40.1 BENIGN PROSTATIC HYPERPLASIA WITH NOCTURIA: ICD-10-CM

## 2022-02-24 DIAGNOSIS — I10 ESSENTIAL HYPERTENSION: ICD-10-CM

## 2022-02-24 DIAGNOSIS — R35.1 BENIGN PROSTATIC HYPERPLASIA WITH NOCTURIA: ICD-10-CM

## 2022-02-24 RX ORDER — TAMSULOSIN HYDROCHLORIDE 0.4 MG/1
CAPSULE ORAL
Qty: 30 CAPSULE | Refills: 2 | Status: SHIPPED | OUTPATIENT
Start: 2022-02-24

## 2022-02-24 NOTE — TELEPHONE ENCOUNTER
Deyanne Saint is calling to request a refill on the following medication(s):    Medication Request:  Requested Prescriptions     Pending Prescriptions Disp Refills    tamsulosin (FLOMAX) 0.4 MG capsule [Pharmacy Med Name: TAMSULOSIN HCL 0.4 MG CAPSULE] 30 capsule 2     Sig: TAKE 1 CAPSULE BY MOUTH EVERY DAY       Last Visit Date (If Applicable):  9/9/6031    Next Visit Date:    5/3/2022

## 2022-02-25 RX ORDER — CHLORTHALIDONE 25 MG/1
TABLET ORAL
Qty: 30 TABLET | Refills: 11 | Status: SHIPPED | OUTPATIENT
Start: 2022-02-25

## 2022-05-03 ENCOUNTER — OFFICE VISIT (OUTPATIENT)
Dept: FAMILY MEDICINE CLINIC | Age: 67
End: 2022-05-03
Payer: COMMERCIAL

## 2022-05-03 DIAGNOSIS — N52.9 ERECTILE DYSFUNCTION, UNSPECIFIED ERECTILE DYSFUNCTION TYPE: Primary | ICD-10-CM

## 2022-05-03 DIAGNOSIS — I10 ESSENTIAL HYPERTENSION: ICD-10-CM

## 2022-05-03 PROCEDURE — G8417 CALC BMI ABV UP PARAM F/U: HCPCS | Performed by: STUDENT IN AN ORGANIZED HEALTH CARE EDUCATION/TRAINING PROGRAM

## 2022-05-03 PROCEDURE — G8427 DOCREV CUR MEDS BY ELIG CLIN: HCPCS | Performed by: STUDENT IN AN ORGANIZED HEALTH CARE EDUCATION/TRAINING PROGRAM

## 2022-05-03 PROCEDURE — 1036F TOBACCO NON-USER: CPT | Performed by: STUDENT IN AN ORGANIZED HEALTH CARE EDUCATION/TRAINING PROGRAM

## 2022-05-03 PROCEDURE — 4040F PNEUMOC VAC/ADMIN/RCVD: CPT | Performed by: STUDENT IN AN ORGANIZED HEALTH CARE EDUCATION/TRAINING PROGRAM

## 2022-05-03 PROCEDURE — 1123F ACP DISCUSS/DSCN MKR DOCD: CPT | Performed by: STUDENT IN AN ORGANIZED HEALTH CARE EDUCATION/TRAINING PROGRAM

## 2022-05-03 PROCEDURE — 3017F COLORECTAL CA SCREEN DOC REV: CPT | Performed by: STUDENT IN AN ORGANIZED HEALTH CARE EDUCATION/TRAINING PROGRAM

## 2022-05-03 PROCEDURE — 99214 OFFICE O/P EST MOD 30 MIN: CPT | Performed by: STUDENT IN AN ORGANIZED HEALTH CARE EDUCATION/TRAINING PROGRAM

## 2022-05-03 RX ORDER — BLOOD PRESSURE TEST KIT
KIT MISCELLANEOUS
Qty: 1 KIT | Refills: 0 | Status: SHIPPED | OUTPATIENT
Start: 2022-05-03

## 2022-05-03 RX ORDER — TADALAFIL 20 MG/1
20 TABLET ORAL DAILY PRN
Qty: 30 TABLET | Refills: 1 | Status: SHIPPED | OUTPATIENT
Start: 2022-05-03 | End: 2022-06-02

## 2022-05-03 RX ORDER — ZOLPIDEM TARTRATE 12.5 MG/1
TABLET, FILM COATED, EXTENDED RELEASE ORAL
COMMUNITY
Start: 2022-03-08

## 2022-05-04 VITALS
SYSTOLIC BLOOD PRESSURE: 140 MMHG | OXYGEN SATURATION: 95 % | HEART RATE: 79 BPM | TEMPERATURE: 98.1 F | WEIGHT: 217 LBS | DIASTOLIC BLOOD PRESSURE: 90 MMHG | BODY MASS INDEX: 29.43 KG/M2

## 2022-05-04 NOTE — PROGRESS NOTES
Subjective:  Marcelo Reid presents for   Chief Complaint   Patient presents with    Hypertension       HPI   Here for follow-up on hypertension. Currently he is on nifedipine 60 mg ER, lisinopril 40 mg and chlorthalidone 25 mg. Denies any headaches or blurry vision. Also here for ED issues. Previously tried sildenafil without effect. Patient Active Problem List   Diagnosis    Chronic pain syndrome    DJD (degenerative joint disease), thoracic    Obesity    ED (erectile dysfunction)    Neuropathy    DJD (degenerative joint disease), lumbosacral    Anemia    Sleep disorder    Internal derangement of knee joint    Unstable gait    Type 2 diabetes mellitus without complication (HCC)    Spondylosis of cervical region without myelopathy or radiculopathy    Essential hypertension    Mixed hyperlipidemia    Complete tear of right rotator cuff    Obstructive rhinitis    Closed trimalleolar fracture of right ankle    Fatigue due to exposure    Gastroesophageal reflux disease    Tear of right supraspinatus tendon    Tear of infraspinatus tendon, right, initial encounter    Tendinosis of right shoulder    Colon polyp         Review of Systems   All other systems reviewed and are negative. Objective:  Physical Exam   Vitals:   Vitals:    05/03/22 1639 05/04/22 1113   BP: (!) 170/78 (!) 140/90   Site: Left Upper Arm    Position: Sitting    Cuff Size: Large Adult    Pulse: 79    Temp: 98.1 °F (36.7 °C)    TempSrc: Temporal    SpO2: 95%    Weight: 217 lb (98.4 kg)      Wt Readings from Last 3 Encounters:   05/03/22 217 lb (98.4 kg)   02/01/22 216 lb (98 kg)   12/15/21 213 lb (96.6 kg)     Ht Readings from Last 3 Encounters:   06/15/21 6' (1.829 m)   06/09/20 6' (1.829 m)   06/04/20 6' (1.829 m)     Body mass index is 29.43 kg/m². Physical Exam  Vitals reviewed. Constitutional:       General: He is not in acute distress. Appearance: Normal appearance.    HENT:      Mouth/Throat:      Mouth: Mucous membranes are moist.   Eyes:      Conjunctiva/sclera: Conjunctivae normal.   Cardiovascular:      Rate and Rhythm: Normal rate and regular rhythm. Heart sounds: Normal heart sounds. Pulmonary:      Effort: Pulmonary effort is normal.      Breath sounds: Normal breath sounds. Abdominal:      General: Abdomen is flat. Bowel sounds are normal.      Palpations: Abdomen is soft. Skin:     General: Skin is warm and dry. Neurological:      Mental Status: He is alert and oriented to person, place, and time. Psychiatric:         Mood and Affect: Mood normal.            Assessment/Plan:    Diagnosis Orders   1. Erectile dysfunction, unspecified erectile dysfunction type  tadalafil (CIALIS) 20 MG tablet   2. Essential hypertension  Blood Pressure KIT        Return in about 4 weeks (around 5/31/2022). EDwould like Edu for his ED issues. Essential hypertensionwe will send in blood pressure kit. Continue on current regimen. Reassess in 1 month for need to increase medication dosage.

## 2022-06-09 ENCOUNTER — OFFICE VISIT (OUTPATIENT)
Dept: FAMILY MEDICINE CLINIC | Age: 67
End: 2022-06-09
Payer: COMMERCIAL

## 2022-06-09 VITALS
WEIGHT: 219 LBS | BODY MASS INDEX: 29.7 KG/M2 | RESPIRATION RATE: 18 BRPM | SYSTOLIC BLOOD PRESSURE: 170 MMHG | DIASTOLIC BLOOD PRESSURE: 90 MMHG

## 2022-06-09 DIAGNOSIS — I10 ESSENTIAL HYPERTENSION: Primary | ICD-10-CM

## 2022-06-09 PROCEDURE — 3017F COLORECTAL CA SCREEN DOC REV: CPT | Performed by: STUDENT IN AN ORGANIZED HEALTH CARE EDUCATION/TRAINING PROGRAM

## 2022-06-09 PROCEDURE — G8417 CALC BMI ABV UP PARAM F/U: HCPCS | Performed by: STUDENT IN AN ORGANIZED HEALTH CARE EDUCATION/TRAINING PROGRAM

## 2022-06-09 PROCEDURE — 1036F TOBACCO NON-USER: CPT | Performed by: STUDENT IN AN ORGANIZED HEALTH CARE EDUCATION/TRAINING PROGRAM

## 2022-06-09 PROCEDURE — 1123F ACP DISCUSS/DSCN MKR DOCD: CPT | Performed by: STUDENT IN AN ORGANIZED HEALTH CARE EDUCATION/TRAINING PROGRAM

## 2022-06-09 PROCEDURE — G8427 DOCREV CUR MEDS BY ELIG CLIN: HCPCS | Performed by: STUDENT IN AN ORGANIZED HEALTH CARE EDUCATION/TRAINING PROGRAM

## 2022-06-09 PROCEDURE — 99213 OFFICE O/P EST LOW 20 MIN: CPT | Performed by: STUDENT IN AN ORGANIZED HEALTH CARE EDUCATION/TRAINING PROGRAM

## 2022-06-09 NOTE — PROGRESS NOTES
Subjective:  Mason Francois presents for   Chief Complaint   Patient presents with    Hypertension       HPI   Here for hypertension. Has been having elevated pressures and is currently on chlorthalidone 25 mg, nifedipine 60 mg and lisinopril 40 mg. There is concern for possible noncompliance. Patient Active Problem List   Diagnosis    Chronic pain syndrome    DJD (degenerative joint disease), thoracic    Obesity    ED (erectile dysfunction)    Neuropathy    DJD (degenerative joint disease), lumbosacral    Anemia    Sleep disorder    Internal derangement of knee joint    Unstable gait    Type 2 diabetes mellitus without complication (HCC)    Spondylosis of cervical region without myelopathy or radiculopathy    Essential hypertension    Mixed hyperlipidemia    Complete tear of right rotator cuff    Obstructive rhinitis    Closed trimalleolar fracture of right ankle    Fatigue due to exposure    Gastroesophageal reflux disease    Tear of right supraspinatus tendon    Tear of infraspinatus tendon, right, initial encounter    Tendinosis of right shoulder    Colon polyp         Review of Systems   All other systems reviewed and are negative. Objective:  Physical Exam   Vitals:   Vitals:    06/09/22 1628   BP: (!) 170/90   Resp: 18   Weight: 219 lb (99.3 kg)     Wt Readings from Last 3 Encounters:   06/09/22 219 lb (99.3 kg)   05/03/22 217 lb (98.4 kg)   02/01/22 216 lb (98 kg)     Ht Readings from Last 3 Encounters:   06/15/21 6' (1.829 m)   06/09/20 6' (1.829 m)   06/04/20 6' (1.829 m)     Body mass index is 29.7 kg/m². Physical Exam  Vitals reviewed. Constitutional:       General: He is not in acute distress. Appearance: Normal appearance. HENT:      Mouth/Throat:      Mouth: Mucous membranes are moist.   Eyes:      Conjunctiva/sclera: Conjunctivae normal.   Cardiovascular:      Rate and Rhythm: Normal rate and regular rhythm. Heart sounds: Normal heart sounds.    Skin:

## 2022-06-13 DIAGNOSIS — M23.91 INTERNAL DERANGEMENT OF RIGHT KNEE: Primary | ICD-10-CM

## 2022-06-13 DIAGNOSIS — M47.812 SPONDYLOSIS OF CERVICAL REGION WITHOUT MYELOPATHY OR RADICULOPATHY: ICD-10-CM

## 2022-06-13 DIAGNOSIS — M47.817 DJD (DEGENERATIVE JOINT DISEASE), LUMBOSACRAL: ICD-10-CM

## 2022-06-13 DIAGNOSIS — G89.4 CHRONIC PAIN SYNDROME: ICD-10-CM

## 2023-10-16 ENCOUNTER — HOSPITAL ENCOUNTER (EMERGENCY)
Age: 68
Discharge: HOME | End: 2023-10-16
Attending: EMERGENCY MEDICINE
Payer: COMMERCIAL

## 2023-10-16 VITALS
HEART RATE: 92 BPM | WEIGHT: 220 LBS | OXYGEN SATURATION: 96 % | SYSTOLIC BLOOD PRESSURE: 176 MMHG | RESPIRATION RATE: 18 BRPM | TEMPERATURE: 99.3 F | BODY MASS INDEX: 29.8 KG/M2 | DIASTOLIC BLOOD PRESSURE: 95 MMHG | HEIGHT: 72 IN

## 2023-10-16 DIAGNOSIS — M54.41 RIGHT-SIDED LOW BACK PAIN WITH RIGHT-SIDED SCIATICA, UNSPECIFIED CHRONICITY: Primary | ICD-10-CM

## 2023-10-16 PROCEDURE — 96372 THER/PROPH/DIAG INJ SC/IM: CPT

## 2023-10-16 PROCEDURE — 6370000000 HC RX 637 (ALT 250 FOR IP): Performed by: NURSE PRACTITIONER

## 2023-10-16 PROCEDURE — 6360000002 HC RX W HCPCS: Performed by: NURSE PRACTITIONER

## 2023-10-16 PROCEDURE — 99284 EMERGENCY DEPT VISIT MOD MDM: CPT

## 2023-10-16 RX ORDER — KETOROLAC TROMETHAMINE 15 MG/ML
15 INJECTION, SOLUTION INTRAMUSCULAR; INTRAVENOUS ONCE
Status: COMPLETED | OUTPATIENT
Start: 2023-10-16 | End: 2023-10-16

## 2023-10-16 RX ORDER — KETOROLAC TROMETHAMINE 10 MG/1
10 TABLET, FILM COATED ORAL ONCE
Status: DISCONTINUED | OUTPATIENT
Start: 2023-10-16 | End: 2023-10-16

## 2023-10-16 RX ORDER — OXYCODONE HYDROCHLORIDE AND ACETAMINOPHEN 5; 325 MG/1; MG/1
1 TABLET ORAL ONCE
Status: COMPLETED | OUTPATIENT
Start: 2023-10-16 | End: 2023-10-16

## 2023-10-16 RX ORDER — KETOROLAC TROMETHAMINE 30 MG/ML
30 INJECTION, SOLUTION INTRAMUSCULAR; INTRAVENOUS ONCE
Status: DISCONTINUED | OUTPATIENT
Start: 2023-10-16 | End: 2023-10-16

## 2023-10-16 RX ORDER — TIZANIDINE 2 MG/1
2 TABLET ORAL EVERY 8 HOURS PRN
Qty: 12 TABLET | Refills: 0 | Status: ON HOLD | OUTPATIENT
Start: 2023-10-16 | End: 2023-10-20 | Stop reason: HOSPADM

## 2023-10-16 RX ORDER — PREDNISONE 10 MG/1
TABLET ORAL
Qty: 30 TABLET | Refills: 0 | Status: ON HOLD | OUTPATIENT
Start: 2023-10-16 | End: 2023-10-20 | Stop reason: SDUPTHER

## 2023-10-16 RX ORDER — ORPHENADRINE CITRATE 30 MG/ML
60 INJECTION INTRAMUSCULAR; INTRAVENOUS ONCE
Status: COMPLETED | OUTPATIENT
Start: 2023-10-16 | End: 2023-10-16

## 2023-10-16 RX ADMIN — KETOROLAC TROMETHAMINE 15 MG: 15 INJECTION, SOLUTION INTRAMUSCULAR; INTRAVENOUS at 20:36

## 2023-10-16 RX ADMIN — ORPHENADRINE CITRATE 60 MG: 60 INJECTION INTRAMUSCULAR; INTRAVENOUS at 20:14

## 2023-10-16 RX ADMIN — DEXAMETHASONE 6 MG: 2 TABLET ORAL at 20:14

## 2023-10-16 RX ADMIN — OXYCODONE AND ACETAMINOPHEN 1 TABLET: 5; 325 TABLET ORAL at 20:14

## 2023-10-16 ASSESSMENT — ENCOUNTER SYMPTOMS
BACK PAIN: 1
ABDOMINAL PAIN: 0
SHORTNESS OF BREATH: 0
VOMITING: 0
COLOR CHANGE: 0
DIARRHEA: 0
NAUSEA: 0

## 2023-10-16 ASSESSMENT — PAIN DESCRIPTION - PAIN TYPE: TYPE: ACUTE PAIN

## 2023-10-16 ASSESSMENT — PAIN DESCRIPTION - DESCRIPTORS: DESCRIPTORS: SHARP;THROBBING;SHOOTING

## 2023-10-16 ASSESSMENT — PAIN DESCRIPTION - FREQUENCY: FREQUENCY: CONTINUOUS

## 2023-10-16 ASSESSMENT — PAIN DESCRIPTION - DIRECTION: RADIATING_TOWARDS: RIGHT LEG

## 2023-10-16 ASSESSMENT — PAIN SCALES - GENERAL
PAINLEVEL_OUTOF10: 8
PAINLEVEL_OUTOF10: 8

## 2023-10-16 ASSESSMENT — PAIN - FUNCTIONAL ASSESSMENT: PAIN_FUNCTIONAL_ASSESSMENT: 0-10

## 2023-10-16 ASSESSMENT — PAIN DESCRIPTION - ORIENTATION: ORIENTATION: RIGHT;LOWER

## 2023-10-16 ASSESSMENT — PAIN DESCRIPTION - LOCATION: LOCATION: BACK

## 2023-10-17 NOTE — ED PROVIDER NOTES
[address] [facility] ED  eMERGENCY dEPARTMENT eNCOUnter      Pt Name: Pasquale Christy  MRN: [id number]  [address] [facility] 1955  Date of evaluation: 10/16/2023  Provider: GAGE Morgan CNP    CHIEF COMPLAINT       Chief Complaint   Patient presents with    Back Pain     C/o lower back pain radiating down right leg, x2-3 days, denies recent injury or taking any medication for pain         HISTORY OF PRESENT ILLNESS  (Location/Symptom, Timing/Onset, Context/Setting, Quality, Duration, Modifying Factors, Severity.)   Pasquale Christy is a 70 y.o. male who presents to the emergency department. C/o right lower back pain that radiates down his right leg. Onset was 10/14/23. Denies fever, chills, injury, weakness, urinary sx. Denies change in bowel and/or bladder control. Denies saddle anesthesia. Pt reports hx sciatica. He is declining imaging today. States he has a ride home. Rates his pain 10/10 at this time. Nursing Notes were reviewed.     ALLERGIES     Meloxicam and Robaxin  [methocarbamol]    CURRENT MEDICATIONS       Previous Medications    BLOOD PRESSURE KIT    Measure once nightly    CHLORTHALIDONE (HYGROTON) 25 MG TABLET    TAKE 1 TABLET BY MOUTH EVERY DAY    FLUOCINONIDE (LIDEX) 0.05 % CREAM    fluocinonide 0.05 % topical cream    GABAPENTIN (NEURONTIN) 300 MG CAPSULE    gabapentin 600 mg capsule prn    HANDICAP PLACARD MISC    by Does not apply route Handicap placard for duration of five years    KETOCONAZOLE (NIZORAL) 2 % SHAMPOO    ketoconazole 2 % shampoo    LISINOPRIL (PRINIVIL;ZESTRIL) 40 MG TABLET    TAKE 1 TABLET BY MOUTH EVERY DAY    METFORMIN (GLUCOPHAGE) 500 MG TABLET    Take 1 tablet by mouth daily (with breakfast)    NIFEDIPINE (ADALAT CC) 60 MG EXTENDED RELEASE TABLET    Take 1 tablet by mouth daily    OMEPRAZOLE (PRILOSEC) 20 MG DELAYED RELEASE CAPSULE    omeprazole 20 mg capsule,delayed release prn    OXYCODONE-ACETAMINOPHEN (PERCOCET) 5-325 MG PER TABLET    oxycodone-acetaminophen

## 2023-10-17 NOTE — DISCHARGE INSTRUCTIONS
Zanaflex:  WARNING:  May cause drowsiness. May impair ability to operate vehicles or machinery. Do not use in combination with alcohol.

## 2023-10-19 ENCOUNTER — HOSPITAL ENCOUNTER (OUTPATIENT)
Age: 68
Setting detail: OBSERVATION
Discharge: HOME | End: 2023-10-20
Attending: EMERGENCY MEDICINE | Admitting: FAMILY MEDICINE
Payer: COMMERCIAL

## 2023-10-19 ENCOUNTER — APPOINTMENT (OUTPATIENT)
Dept: CT IMAGING | Age: 68
End: 2023-10-19
Payer: COMMERCIAL

## 2023-10-19 ENCOUNTER — APPOINTMENT (OUTPATIENT)
Dept: MRI IMAGING | Age: 68
End: 2023-10-19
Payer: COMMERCIAL

## 2023-10-19 DIAGNOSIS — I10 ESSENTIAL HYPERTENSION: ICD-10-CM

## 2023-10-19 DIAGNOSIS — G89.29 ACUTE EXACERBATION OF CHRONIC LOW BACK PAIN: Primary | ICD-10-CM

## 2023-10-19 DIAGNOSIS — M54.50 ACUTE EXACERBATION OF CHRONIC LOW BACK PAIN: Primary | ICD-10-CM

## 2023-10-19 DIAGNOSIS — M51.36 DEGENERATION OF LUMBAR INTERVERTEBRAL DISC: ICD-10-CM

## 2023-10-19 PROBLEM — M54.10 ACUTE BACK PAIN WITH RADICULOPATHY: Status: ACTIVE | Noted: 2023-10-19

## 2023-10-19 LAB
ALBUMIN SERPL-MCNC: 4.1 G/DL (ref 3.5–5.2)
ALP SERPL-CCNC: 78 U/L (ref 40–129)
ALT SERPL-CCNC: 19 U/L (ref 5–41)
ANION GAP SERPL CALCULATED.3IONS-SCNC: 15 MMOL/L (ref 9–17)
AST SERPL-CCNC: 19 U/L
BASOPHILS # BLD: <0.03 K/UL (ref 0–0.2)
BASOPHILS NFR BLD: 0 % (ref 0–2)
BILIRUB SERPL-MCNC: 0.9 MG/DL (ref 0.3–1.2)
BILIRUB UR QL STRIP: NEGATIVE
BUN SERPL-MCNC: 24 MG/DL (ref 8–23)
BUN/CREAT SERPL: 24 (ref 9–20)
CALCIUM SERPL-MCNC: 9.4 MG/DL (ref 8.6–10.4)
CHLORIDE SERPL-SCNC: 97 MMOL/L (ref 98–107)
CLARITY UR: CLEAR
CO2 SERPL-SCNC: 23 MMOL/L (ref 20–31)
COLOR UR: YELLOW
CREAT SERPL-MCNC: 1 MG/DL (ref 0.7–1.2)
EOSINOPHIL # BLD: <0.03 K/UL (ref 0–0.44)
EOSINOPHILS RELATIVE PERCENT: 0 % (ref 1–4)
EPI CELLS #/AREA URNS HPF: NORMAL /HPF (ref 0–5)
ERYTHROCYTE [DISTWIDTH] IN BLOOD BY AUTOMATED COUNT: 12.2 % (ref 11.8–14.4)
ERYTHROCYTE [SEDIMENTATION RATE] IN BLOOD BY PHOTOMETRIC METHOD: 26 MM/HR (ref 0–20)
GFR SERPL CREATININE-BSD FRML MDRD: >60 ML/MIN/1.73M2
GLUCOSE SERPL-MCNC: 208 MG/DL (ref 70–99)
GLUCOSE UR STRIP-MCNC: NEGATIVE MG/DL
HCT VFR BLD AUTO: 38.4 % (ref 40.7–50.3)
HGB BLD-MCNC: 12.7 G/DL (ref 13–17)
HGB UR QL STRIP.AUTO: NEGATIVE
IMM GRANULOCYTES # BLD AUTO: 0.07 K/UL (ref 0–0.3)
IMM GRANULOCYTES NFR BLD: 1 %
KETONES UR STRIP-MCNC: NEGATIVE MG/DL
LEUKOCYTE ESTERASE UR QL STRIP: NEGATIVE
LYMPHOCYTES NFR BLD: 0.88 K/UL (ref 1.1–3.7)
LYMPHOCYTES RELATIVE PERCENT: 6 % (ref 24–43)
MAGNESIUM SERPL-MCNC: 1.9 MG/DL (ref 1.6–2.6)
MCH RBC QN AUTO: 28.1 PG (ref 25.2–33.5)
MCHC RBC AUTO-ENTMCNC: 33.1 G/DL (ref 28.4–34.8)
MCV RBC AUTO: 85 FL (ref 82.6–102.9)
MONOCYTES NFR BLD: 1 K/UL (ref 0.1–1.2)
MONOCYTES NFR BLD: 7 % (ref 3–12)
NEUTROPHILS NFR BLD: 86 % (ref 36–65)
NEUTS SEG NFR BLD: 12.29 K/UL (ref 1.5–8.1)
NITRITE UR QL STRIP: NEGATIVE
NRBC BLD-RTO: 0 PER 100 WBC
PH UR STRIP: 6.5 [PH] (ref 5–8)
PLATELET # BLD AUTO: 255 K/UL (ref 138–453)
PMV BLD AUTO: 10.4 FL (ref 8.1–13.5)
POTASSIUM SERPL-SCNC: 3.6 MMOL/L (ref 3.7–5.3)
PROT SERPL-MCNC: 7.6 G/DL (ref 6.4–8.3)
PROT UR STRIP-MCNC: NEGATIVE MG/DL
RBC # BLD AUTO: 4.52 M/UL (ref 4.21–5.77)
RBC #/AREA URNS HPF: NORMAL /HPF (ref 0–2)
SODIUM SERPL-SCNC: 135 MMOL/L (ref 135–144)
SP GR UR STRIP: 1.02 (ref 1–1.03)
UROBILINOGEN UR STRIP-ACNC: NORMAL EU/DL (ref 0–1)
WBC #/AREA URNS HPF: NORMAL /HPF (ref 0–5)
WBC OTHER # BLD: 14.3 K/UL (ref 3.5–11.3)

## 2023-10-19 PROCEDURE — G0378 HOSPITAL OBSERVATION PER HR: HCPCS

## 2023-10-19 PROCEDURE — 99222 1ST HOSP IP/OBS MODERATE 55: CPT | Performed by: NURSE PRACTITIONER

## 2023-10-19 PROCEDURE — 6370000000 HC RX 637 (ALT 250 FOR IP): Performed by: EMERGENCY MEDICINE

## 2023-10-19 PROCEDURE — 85652 RBC SED RATE AUTOMATED: CPT

## 2023-10-19 PROCEDURE — 6370000000 HC RX 637 (ALT 250 FOR IP): Performed by: NURSE PRACTITIONER

## 2023-10-19 PROCEDURE — 6360000004 HC RX CONTRAST MEDICATION: Performed by: EMERGENCY MEDICINE

## 2023-10-19 PROCEDURE — 72131 CT LUMBAR SPINE W/O DYE: CPT

## 2023-10-19 PROCEDURE — 96375 TX/PRO/DX INJ NEW DRUG ADDON: CPT

## 2023-10-19 PROCEDURE — 99285 EMERGENCY DEPT VISIT HI MDM: CPT

## 2023-10-19 PROCEDURE — 83735 ASSAY OF MAGNESIUM: CPT

## 2023-10-19 PROCEDURE — 96374 THER/PROPH/DIAG INJ IV PUSH: CPT

## 2023-10-19 PROCEDURE — 6360000002 HC RX W HCPCS: Performed by: EMERGENCY MEDICINE

## 2023-10-19 PROCEDURE — 96376 TX/PRO/DX INJ SAME DRUG ADON: CPT

## 2023-10-19 PROCEDURE — 6360000002 HC RX W HCPCS: Performed by: NURSE PRACTITIONER

## 2023-10-19 PROCEDURE — 72148 MRI LUMBAR SPINE W/O DYE: CPT

## 2023-10-19 PROCEDURE — 73700 CT LOWER EXTREMITY W/O DYE: CPT

## 2023-10-19 PROCEDURE — 2580000003 HC RX 258: Performed by: EMERGENCY MEDICINE

## 2023-10-19 PROCEDURE — 74177 CT ABD & PELVIS W/CONTRAST: CPT

## 2023-10-19 PROCEDURE — 81001 URINALYSIS AUTO W/SCOPE: CPT

## 2023-10-19 PROCEDURE — 96361 HYDRATE IV INFUSION ADD-ON: CPT

## 2023-10-19 PROCEDURE — 80053 COMPREHEN METABOLIC PANEL: CPT

## 2023-10-19 PROCEDURE — 85025 COMPLETE CBC W/AUTO DIFF WBC: CPT

## 2023-10-19 PROCEDURE — 2580000003 HC RX 258: Performed by: NURSE PRACTITIONER

## 2023-10-19 PROCEDURE — 96372 THER/PROPH/DIAG INJ SC/IM: CPT

## 2023-10-19 RX ORDER — ACETAMINOPHEN 325 MG/1
650 TABLET ORAL EVERY 6 HOURS PRN
Status: DISCONTINUED | OUTPATIENT
Start: 2023-10-19 | End: 2023-10-20 | Stop reason: HOSPADM

## 2023-10-19 RX ORDER — OXYCODONE HYDROCHLORIDE AND ACETAMINOPHEN 5; 325 MG/1; MG/1
2 TABLET ORAL EVERY 4 HOURS PRN
Status: DISCONTINUED | OUTPATIENT
Start: 2023-10-19 | End: 2023-10-20 | Stop reason: HOSPADM

## 2023-10-19 RX ORDER — SODIUM CHLORIDE 9 MG/ML
INJECTION, SOLUTION INTRAVENOUS PRN
Status: DISCONTINUED | OUTPATIENT
Start: 2023-10-19 | End: 2023-10-20 | Stop reason: HOSPADM

## 2023-10-19 RX ORDER — PANTOPRAZOLE SODIUM 40 MG/1
40 TABLET, DELAYED RELEASE ORAL
Status: DISCONTINUED | OUTPATIENT
Start: 2023-10-20 | End: 2023-10-20 | Stop reason: HOSPADM

## 2023-10-19 RX ORDER — DEXAMETHASONE SODIUM PHOSPHATE 10 MG/ML
6 INJECTION, SOLUTION INTRAMUSCULAR; INTRAVENOUS EVERY 12 HOURS
Status: DISCONTINUED | OUTPATIENT
Start: 2023-10-20 | End: 2023-10-20 | Stop reason: HOSPADM

## 2023-10-19 RX ORDER — SODIUM CHLORIDE 0.9 % (FLUSH) 0.9 %
10 SYRINGE (ML) INJECTION ONCE
Status: COMPLETED | OUTPATIENT
Start: 2023-10-19 | End: 2023-10-19

## 2023-10-19 RX ORDER — ENOXAPARIN SODIUM 100 MG/ML
40 INJECTION SUBCUTANEOUS EVERY 24 HOURS
Status: DISCONTINUED | OUTPATIENT
Start: 2023-10-19 | End: 2023-10-20 | Stop reason: HOSPADM

## 2023-10-19 RX ORDER — POTASSIUM CHLORIDE 20 MEQ/1
40 TABLET, EXTENDED RELEASE ORAL PRN
Status: DISCONTINUED | OUTPATIENT
Start: 2023-10-19 | End: 2023-10-20 | Stop reason: HOSPADM

## 2023-10-19 RX ORDER — POLYETHYLENE GLYCOL 3350 17 G/17G
17 POWDER, FOR SOLUTION ORAL DAILY PRN
Status: DISCONTINUED | OUTPATIENT
Start: 2023-10-19 | End: 2023-10-20 | Stop reason: HOSPADM

## 2023-10-19 RX ORDER — SODIUM CHLORIDE 0.9 % (FLUSH) 0.9 %
5-40 SYRINGE (ML) INJECTION EVERY 12 HOURS SCHEDULED
Status: DISCONTINUED | OUTPATIENT
Start: 2023-10-19 | End: 2023-10-20 | Stop reason: HOSPADM

## 2023-10-19 RX ORDER — LISINOPRIL 40 MG/1
40 TABLET ORAL DAILY
Status: DISCONTINUED | OUTPATIENT
Start: 2023-10-19 | End: 2023-10-19 | Stop reason: SDUPTHER

## 2023-10-19 RX ORDER — HYDRALAZINE HYDROCHLORIDE 50 MG/1
50 TABLET, FILM COATED ORAL EVERY 8 HOURS PRN
Status: DISCONTINUED | OUTPATIENT
Start: 2023-10-19 | End: 2023-10-20 | Stop reason: HOSPADM

## 2023-10-19 RX ORDER — DEXAMETHASONE SODIUM PHOSPHATE 10 MG/ML
10 INJECTION, SOLUTION INTRAMUSCULAR; INTRAVENOUS ONCE
Status: DISCONTINUED | OUTPATIENT
Start: 2023-10-19 | End: 2023-10-19 | Stop reason: SDUPTHER

## 2023-10-19 RX ORDER — SODIUM CHLORIDE 0.9 % (FLUSH) 0.9 %
10 SYRINGE (ML) INJECTION PRN
Status: DISCONTINUED | OUTPATIENT
Start: 2023-10-19 | End: 2023-10-20 | Stop reason: HOSPADM

## 2023-10-19 RX ORDER — ORPHENADRINE CITRATE 30 MG/ML
60 INJECTION INTRAMUSCULAR; INTRAVENOUS ONCE
Status: COMPLETED | OUTPATIENT
Start: 2023-10-19 | End: 2023-10-19

## 2023-10-19 RX ORDER — TIZANIDINE 2 MG/1
2 TABLET ORAL EVERY 8 HOURS PRN
Status: DISCONTINUED | OUTPATIENT
Start: 2023-10-19 | End: 2023-10-20 | Stop reason: HOSPADM

## 2023-10-19 RX ORDER — 0.9 % SODIUM CHLORIDE 0.9 %
80 INTRAVENOUS SOLUTION INTRAVENOUS ONCE
Status: COMPLETED | OUTPATIENT
Start: 2023-10-19 | End: 2023-10-19

## 2023-10-19 RX ORDER — MAGNESIUM SULFATE 1 G/100ML
1000 INJECTION INTRAVENOUS PRN
Status: DISCONTINUED | OUTPATIENT
Start: 2023-10-19 | End: 2023-10-20 | Stop reason: HOSPADM

## 2023-10-19 RX ORDER — GABAPENTIN 300 MG/1
300 CAPSULE ORAL 3 TIMES DAILY
Status: DISCONTINUED | OUTPATIENT
Start: 2023-10-19 | End: 2023-10-20 | Stop reason: HOSPADM

## 2023-10-19 RX ORDER — TAMSULOSIN HYDROCHLORIDE 0.4 MG/1
0.4 CAPSULE ORAL DAILY
Status: DISCONTINUED | OUTPATIENT
Start: 2023-10-19 | End: 2023-10-20 | Stop reason: HOSPADM

## 2023-10-19 RX ORDER — KETOROLAC TROMETHAMINE 15 MG/ML
15 INJECTION, SOLUTION INTRAMUSCULAR; INTRAVENOUS ONCE
Status: COMPLETED | OUTPATIENT
Start: 2023-10-19 | End: 2023-10-19

## 2023-10-19 RX ORDER — 0.9 % SODIUM CHLORIDE 0.9 %
1000 INTRAVENOUS SOLUTION INTRAVENOUS ONCE
Status: DISCONTINUED | OUTPATIENT
Start: 2023-10-19 | End: 2023-10-20 | Stop reason: HOSPADM

## 2023-10-19 RX ORDER — HYDROMORPHONE HYDROCHLORIDE 1 MG/ML
1 INJECTION, SOLUTION INTRAMUSCULAR; INTRAVENOUS; SUBCUTANEOUS ONCE
Status: COMPLETED | OUTPATIENT
Start: 2023-10-19 | End: 2023-10-19

## 2023-10-19 RX ORDER — CHLORTHALIDONE 25 MG/1
25 TABLET ORAL DAILY
Status: DISCONTINUED | OUTPATIENT
Start: 2023-10-19 | End: 2023-10-20 | Stop reason: HOSPADM

## 2023-10-19 RX ORDER — NIFEDIPINE 30 MG/1
60 TABLET, EXTENDED RELEASE ORAL DAILY
Status: DISCONTINUED | OUTPATIENT
Start: 2023-10-19 | End: 2023-10-20 | Stop reason: HOSPADM

## 2023-10-19 RX ORDER — ONDANSETRON 2 MG/ML
4 INJECTION INTRAMUSCULAR; INTRAVENOUS EVERY 6 HOURS PRN
Status: DISCONTINUED | OUTPATIENT
Start: 2023-10-19 | End: 2023-10-20 | Stop reason: HOSPADM

## 2023-10-19 RX ORDER — LISINOPRIL 40 MG/1
40 TABLET ORAL DAILY
Status: DISCONTINUED | OUTPATIENT
Start: 2023-10-19 | End: 2023-10-20 | Stop reason: HOSPADM

## 2023-10-19 RX ORDER — ATORVASTATIN CALCIUM 40 MG/1
40 TABLET, FILM COATED ORAL DAILY
Status: DISCONTINUED | OUTPATIENT
Start: 2023-10-19 | End: 2023-10-20 | Stop reason: HOSPADM

## 2023-10-19 RX ORDER — 0.9 % SODIUM CHLORIDE 0.9 %
1000 INTRAVENOUS SOLUTION INTRAVENOUS ONCE
Status: COMPLETED | OUTPATIENT
Start: 2023-10-19 | End: 2023-10-19

## 2023-10-19 RX ORDER — ONDANSETRON 4 MG/1
4 TABLET, ORALLY DISINTEGRATING ORAL EVERY 8 HOURS PRN
Status: DISCONTINUED | OUTPATIENT
Start: 2023-10-19 | End: 2023-10-20 | Stop reason: HOSPADM

## 2023-10-19 RX ORDER — DEXAMETHASONE SODIUM PHOSPHATE 10 MG/ML
10 INJECTION, SOLUTION INTRAMUSCULAR; INTRAVENOUS ONCE
Status: COMPLETED | OUTPATIENT
Start: 2023-10-19 | End: 2023-10-19

## 2023-10-19 RX ORDER — LABETALOL HYDROCHLORIDE 5 MG/ML
10 INJECTION, SOLUTION INTRAVENOUS ONCE
Status: COMPLETED | OUTPATIENT
Start: 2023-10-19 | End: 2023-10-19

## 2023-10-19 RX ORDER — POTASSIUM CHLORIDE 7.45 MG/ML
10 INJECTION INTRAVENOUS PRN
Status: DISCONTINUED | OUTPATIENT
Start: 2023-10-19 | End: 2023-10-20 | Stop reason: HOSPADM

## 2023-10-19 RX ORDER — ACETAMINOPHEN 650 MG/1
650 SUPPOSITORY RECTAL EVERY 6 HOURS PRN
Status: DISCONTINUED | OUTPATIENT
Start: 2023-10-19 | End: 2023-10-20 | Stop reason: HOSPADM

## 2023-10-19 RX ADMIN — NIFEDIPINE 60 MG: 30 TABLET, FILM COATED, EXTENDED RELEASE ORAL at 18:16

## 2023-10-19 RX ADMIN — GABAPENTIN 300 MG: 300 CAPSULE ORAL at 19:57

## 2023-10-19 RX ADMIN — LISINOPRIL 40 MG: 10 TABLET ORAL at 08:51

## 2023-10-19 RX ADMIN — HYDRALAZINE HYDROCHLORIDE 50 MG: 50 TABLET, FILM COATED ORAL at 14:48

## 2023-10-19 RX ADMIN — HYDROMORPHONE HYDROCHLORIDE 0.5 MG: 1 INJECTION, SOLUTION INTRAMUSCULAR; INTRAVENOUS; SUBCUTANEOUS at 14:27

## 2023-10-19 RX ADMIN — ENOXAPARIN SODIUM 40 MG: 100 INJECTION SUBCUTANEOUS at 20:00

## 2023-10-19 RX ADMIN — SODIUM CHLORIDE 80 ML: 9 INJECTION, SOLUTION INTRAVENOUS at 09:32

## 2023-10-19 RX ADMIN — HYDROMORPHONE HYDROCHLORIDE 0.5 MG: 1 INJECTION, SOLUTION INTRAMUSCULAR; INTRAVENOUS; SUBCUTANEOUS at 07:33

## 2023-10-19 RX ADMIN — IOPAMIDOL 75 ML: 755 INJECTION, SOLUTION INTRAVENOUS at 09:32

## 2023-10-19 RX ADMIN — SODIUM CHLORIDE, PRESERVATIVE FREE 10 ML: 5 INJECTION INTRAVENOUS at 09:32

## 2023-10-19 RX ADMIN — LABETALOL HYDROCHLORIDE 10 MG: 5 INJECTION, SOLUTION INTRAVENOUS at 08:48

## 2023-10-19 RX ADMIN — ATORVASTATIN CALCIUM 40 MG: 40 TABLET, FILM COATED ORAL at 19:57

## 2023-10-19 RX ADMIN — TIZANIDINE 2 MG: 2 TABLET ORAL at 18:27

## 2023-10-19 RX ADMIN — ORPHENADRINE CITRATE 60 MG: 60 INJECTION INTRAMUSCULAR; INTRAVENOUS at 12:47

## 2023-10-19 RX ADMIN — OXYCODONE AND ACETAMINOPHEN 2 TABLET: 5; 325 TABLET ORAL at 16:02

## 2023-10-19 RX ADMIN — KETOROLAC TROMETHAMINE 15 MG: 15 INJECTION, SOLUTION INTRAMUSCULAR; INTRAVENOUS at 10:35

## 2023-10-19 RX ADMIN — SODIUM CHLORIDE, PRESERVATIVE FREE 10 ML: 5 INJECTION INTRAVENOUS at 19:57

## 2023-10-19 RX ADMIN — SODIUM CHLORIDE, PRESERVATIVE FREE 10 ML: 5 INJECTION INTRAVENOUS at 14:28

## 2023-10-19 RX ADMIN — OXYCODONE AND ACETAMINOPHEN 2 TABLET: 5; 325 TABLET ORAL at 21:03

## 2023-10-19 RX ADMIN — DEXAMETHASONE SODIUM PHOSPHATE 10 MG: 10 INJECTION, SOLUTION INTRAMUSCULAR; INTRAVENOUS at 12:47

## 2023-10-19 RX ADMIN — GABAPENTIN 300 MG: 300 CAPSULE ORAL at 14:48

## 2023-10-19 RX ADMIN — SODIUM CHLORIDE 1000 ML: 9 INJECTION, SOLUTION INTRAVENOUS at 07:33

## 2023-10-19 RX ADMIN — HYDROMORPHONE HYDROCHLORIDE 1 MG: 1 INJECTION, SOLUTION INTRAMUSCULAR; INTRAVENOUS; SUBCUTANEOUS at 06:40

## 2023-10-19 ASSESSMENT — PAIN DESCRIPTION - FREQUENCY
FREQUENCY: CONTINUOUS

## 2023-10-19 ASSESSMENT — PAIN - FUNCTIONAL ASSESSMENT
PAIN_FUNCTIONAL_ASSESSMENT: ACTIVITIES ARE NOT PREVENTED
PAIN_FUNCTIONAL_ASSESSMENT: 0-10
PAIN_FUNCTIONAL_ASSESSMENT: PREVENTS OR INTERFERES SOME ACTIVE ACTIVITIES AND ADLS
PAIN_FUNCTIONAL_ASSESSMENT: ACTIVITIES ARE NOT PREVENTED
PAIN_FUNCTIONAL_ASSESSMENT: 0-10

## 2023-10-19 ASSESSMENT — PAIN DESCRIPTION - LOCATION
LOCATION: HIP
LOCATION: BACK;LEG
LOCATION: BACK;LEG
LOCATION: LEG;BACK
LOCATION: BACK
LOCATION: BACK

## 2023-10-19 ASSESSMENT — PAIN SCALES - GENERAL
PAINLEVEL_OUTOF10: 8
PAINLEVEL_OUTOF10: 10
PAINLEVEL_OUTOF10: 4
PAINLEVEL_OUTOF10: 7
PAINLEVEL_OUTOF10: 4
PAINLEVEL_OUTOF10: 4
PAINLEVEL_OUTOF10: 10
PAINLEVEL_OUTOF10: 7
PAINLEVEL_OUTOF10: 9
PAINLEVEL_OUTOF10: 8
PAINLEVEL_OUTOF10: 3

## 2023-10-19 ASSESSMENT — ENCOUNTER SYMPTOMS
NAUSEA: 0
DIARRHEA: 0
SINUS PAIN: 0
VOMITING: 0
SINUS PRESSURE: 0
SHORTNESS OF BREATH: 0
WHEEZING: 0
CONSTIPATION: 0
PHOTOPHOBIA: 0
BACK PAIN: 1
COUGH: 0
ABDOMINAL PAIN: 0
ABDOMINAL PAIN: 1

## 2023-10-19 ASSESSMENT — PAIN DESCRIPTION - DESCRIPTORS
DESCRIPTORS: ACHING;SHARP;SPASM
DESCRIPTORS: ACHING
DESCRIPTORS: SPASM;SHARP
DESCRIPTORS: ACHING;SPASM
DESCRIPTORS: DISCOMFORT;SPASM;SHARP

## 2023-10-19 ASSESSMENT — PAIN DESCRIPTION - PAIN TYPE
TYPE: ACUTE PAIN
TYPE: ACUTE PAIN;CHRONIC PAIN
TYPE: ACUTE PAIN

## 2023-10-19 ASSESSMENT — PAIN DESCRIPTION - ONSET
ONSET: ON-GOING

## 2023-10-19 ASSESSMENT — PAIN DESCRIPTION - ORIENTATION
ORIENTATION: RIGHT
ORIENTATION: RIGHT
ORIENTATION: LOWER;RIGHT
ORIENTATION: LOWER
ORIENTATION: RIGHT
ORIENTATION: RIGHT

## 2023-10-19 ASSESSMENT — PAIN DESCRIPTION - DIRECTION
RADIATING_TOWARDS: RIGHT LEG
RADIATING_TOWARDS: RIGHT LEG

## 2023-10-19 NOTE — ED TRIAGE NOTES
Pt comes to the ED via EMS w/ c/o right sided sciatica pain that starts in his right lower back and radiates down his le. Pt reports pain started at about 2300 and has gotten progressively. Pt seen here on Monday for the same complaint and got a shot that helped but does not know what it was. He was sent home with percocet but reports it isn't working. Pt sweating and yelling. Pt is A&Ox4, RR even and non-labored. Will continue to monitor.

## 2023-10-19 NOTE — H&P
[facility]  Office: [id number], DO, Ander Dave, DO, Moustapha Regalado, DO, Marylen Dames Rinku, DO, Erin Holland MD, Kiara Navarro MD, Linda Rodgers MD, Clifford Rowell MD,  Eunice Osler, MD, Jewel Barajas MD, Marie Collazo DO, Christal Leigh MD,  Beck Baker MD, Noel Manzo MD, Bertha Galarza DO, Rudy Miranda MD,  Ayo Gu DO, Bk Stallworth MD, Heather Terry MD, Sravanthi Panchal MD, Yuli Herrera MD,  Malik Masters MD, Gloria Nichols MD, Marilyn Sales MD, Makenna Naranjo MD, Reza Morales DO, Dwight Scales MD,  Jorge Em MD, Davin Martel MD, Alida Mello, CNP,  Alla Meredith, CNP,, Darron North, CNP,  Juan Nelson, DNP, Boogie Larios, CNP, César Diez, CNP, Farhana hCauhan, CNP, Francesco Huggins, CNP, Jermaine Sun, CNP, Filipe Blackwell, CNP, Loretta Velazquez, [facility], Olga Chaudhary, CNP, Vamshi Torres, [address] [facility]    HISTORY AND PHYSICAL EXAMINATION            Date:   10/19/2023  Patient name:  Iram Chow  Date of admission:  10/19/2023  6:28 AM  MRN:   [id number]  Account:  [de-identified]  YOB: 1955  PCP:    Abi Riley MD  Room:   [facility] GERRY/GERRY  Code Status:    Prior    Chief Complaint:     Chief Complaint   Patient presents with    Back Pain     Lower right back radiating down leg. Seen Monday for the same issue. Leg Pain       History Obtained From:     patient    History of Present Illness:     Iram Chow is a 70 y.o. Non- / non  male who presents with Back Pain (Lower right back radiating down leg. Seen Monday for the same issue. ) and Leg Pain   and is admitted to the hospital for the management of Acute back pain with radiculopathy. Patient has a known history of degenerative disc disease of the lumbar spine.   Patient reports over the past 36 hours she has intractable back pain that is shooting down his right

## 2023-10-19 NOTE — PROGRESS NOTES
Pt admitted to room #2002 from ER  Oriented to room and call light/tv controls. Bed in lowest position, wheels locked, 2/4 side rails up  Call light in reach, room free of clutter, adequate lighting provided.
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
## 2023-10-19 NOTE — ED NOTES
Upon reassessment pt rates his back and rt leg pain 8/10. Pt standing at bedside with RN and pt unsteady on his feet. Pt assisted back into bed.  RN will continue to monitor     Mariano Varghese RN  10/19/23 7630

## 2023-10-19 NOTE — PLAN OF CARE
Problem: Discharge Planning  Goal: Discharge to home or other facility with appropriate resources  Outcome: Progressing  Flowsheets  Taken 10/19/2023 1531  Discharge to home or other facility with appropriate resources:   Identify barriers to discharge with patient and caregiver   Identify discharge learning needs (meds, wound care, etc)   Arrange for needed discharge resources and transportation as appropriate  Taken 10/19/2023 1423  Discharge to home or other facility with appropriate resources:   Identify barriers to discharge with patient and caregiver   Arrange for needed discharge resources and transportation as appropriate   Identify discharge learning needs (meds, wound care, etc)     Problem: Pain  Goal: Verbalizes/displays adequate comfort level or baseline comfort level  Outcome: Progressing

## 2023-10-19 NOTE — CARE COORDINATION
Case Management Assessment  Initial Evaluation    Date/Time of Evaluation: 10/19/2023 1:59 PM  Assessment Completed by: JESSENIA Martinez    If patient is discharged prior to next notation, then this note serves as note for discharge by case management. Patient Name: Kyle Hudson                   YOB: 1955  Diagnosis: Acute back pain with radiculopathy [M54.10]                   Date / Time: 10/19/2023  6:28 AM    Patient Admission Status: Observation   Readmission Risk (Low < 19, Mod (19-27), High > 27): No data recorded  Current PCP: Yomi Figueroa MD  PCP verified by CM? Yes    Chart Reviewed: Yes      History Provided by: son  Patient Orientation: Other (see comment) (spoke with son)    Patient Cognition: Other (see comment) (spoke with son)    Hospitalization in the last 30 days (Readmission):  No    If yes, Readmission Assessment in  Navigator will be completed. Advance Directives:      Code Status: Prior   Patient's Primary Decision Maker is: Legal Next of Kin      Discharge Planning:    Patient lives with: Alone Type of Home: House  Primary Care Giver: Self  Patient Support Systems include: Family Members, Children   Current Financial resources: Other (Comment) (commercial ins)  Current community resources: None  Current services prior to admission: None            Current DME:              Type of Home Care services:  None    ADLS  Prior functional level: Independent in ADLs/IADLs  Current functional level: Independent in ADLs/IADLs    PT AM-PAC:   /24  OT AM-PAC:   /24    Family can provide assistance at DC: No  Would you like Case Management to discuss the discharge plan with any other family members/significant others, and if so, who?  No  Plans to Return to Present Housing: Yes  Other Identified Issues/Barriers to RETURNING to current housing: pain  Potential Assistance needed at discharge: N/A            Potential DME:    Patient expects to discharge to: [address] [place] [address]

## 2023-10-20 VITALS
TEMPERATURE: 98.8 F | DIASTOLIC BLOOD PRESSURE: 84 MMHG | BODY MASS INDEX: 29.28 KG/M2 | OXYGEN SATURATION: 98 % | HEART RATE: 76 BPM | RESPIRATION RATE: 18 BRPM | SYSTOLIC BLOOD PRESSURE: 163 MMHG | HEIGHT: 73 IN | WEIGHT: 220.9 LBS

## 2023-10-20 LAB
ANION GAP SERPL CALCULATED.3IONS-SCNC: 12 MMOL/L (ref 9–17)
BASOPHILS # BLD: 0 K/UL (ref 0–0.2)
BASOPHILS NFR BLD: 0 %
BUN SERPL-MCNC: 23 MG/DL (ref 8–23)
BUN/CREAT SERPL: 23 (ref 9–20)
CALCIUM SERPL-MCNC: 9.3 MG/DL (ref 8.6–10.4)
CHLORIDE SERPL-SCNC: 99 MMOL/L (ref 98–107)
CO2 SERPL-SCNC: 23 MMOL/L (ref 20–31)
CREAT SERPL-MCNC: 1 MG/DL (ref 0.7–1.2)
EOSINOPHIL # BLD: 0 K/UL (ref 0–0.4)
EOSINOPHILS RELATIVE PERCENT: 0 % (ref 1–4)
ERYTHROCYTE [DISTWIDTH] IN BLOOD BY AUTOMATED COUNT: 12.1 % (ref 11.8–14.4)
GFR SERPL CREATININE-BSD FRML MDRD: >60 ML/MIN/1.73M2
GLUCOSE SERPL-MCNC: 214 MG/DL (ref 70–99)
HCT VFR BLD AUTO: 38.7 % (ref 40.7–50.3)
HGB BLD-MCNC: 12.7 G/DL (ref 13–17)
IMM GRANULOCYTES # BLD AUTO: 0 K/UL (ref 0–0.3)
IMM GRANULOCYTES NFR BLD: 0 %
LYMPHOCYTES NFR BLD: 0.68 K/UL (ref 1–4.8)
LYMPHOCYTES RELATIVE PERCENT: 5 % (ref 24–44)
MCH RBC QN AUTO: 28 PG (ref 25.2–33.5)
MCHC RBC AUTO-ENTMCNC: 32.8 G/DL (ref 28.4–34.8)
MCV RBC AUTO: 85.4 FL (ref 82.6–102.9)
MONOCYTES NFR BLD: 0.54 K/UL (ref 0.2–0.8)
MONOCYTES NFR BLD: 4 % (ref 1–7)
NEUTROPHILS NFR BLD: 91 % (ref 36–66)
NEUTS SEG NFR BLD: 12.28 K/UL (ref 1.8–7.7)
NRBC BLD-RTO: 0 PER 100 WBC
PLATELET # BLD AUTO: 250 K/UL (ref 138–453)
PMV BLD AUTO: 10.3 FL (ref 8.1–13.5)
POTASSIUM SERPL-SCNC: 4.3 MMOL/L (ref 3.7–5.3)
RBC # BLD AUTO: 4.53 M/UL (ref 4.21–5.77)
SODIUM SERPL-SCNC: 134 MMOL/L (ref 135–144)
WBC OTHER # BLD: 13.5 K/UL (ref 3.5–11.3)

## 2023-10-20 PROCEDURE — 96372 THER/PROPH/DIAG INJ SC/IM: CPT

## 2023-10-20 PROCEDURE — 6360000002 HC RX W HCPCS: Performed by: NURSE PRACTITIONER

## 2023-10-20 PROCEDURE — 85025 COMPLETE CBC W/AUTO DIFF WBC: CPT

## 2023-10-20 PROCEDURE — 97535 SELF CARE MNGMENT TRAINING: CPT

## 2023-10-20 PROCEDURE — 80048 BASIC METABOLIC PNL TOTAL CA: CPT

## 2023-10-20 PROCEDURE — 36415 COLL VENOUS BLD VENIPUNCTURE: CPT

## 2023-10-20 PROCEDURE — G0378 HOSPITAL OBSERVATION PER HR: HCPCS

## 2023-10-20 PROCEDURE — 96376 TX/PRO/DX INJ SAME DRUG ADON: CPT

## 2023-10-20 PROCEDURE — 97116 GAIT TRAINING THERAPY: CPT

## 2023-10-20 PROCEDURE — 6370000000 HC RX 637 (ALT 250 FOR IP): Performed by: NURSE PRACTITIONER

## 2023-10-20 PROCEDURE — 97166 OT EVAL MOD COMPLEX 45 MIN: CPT

## 2023-10-20 PROCEDURE — 97162 PT EVAL MOD COMPLEX 30 MIN: CPT

## 2023-10-20 PROCEDURE — 2580000003 HC RX 258: Performed by: NURSE PRACTITIONER

## 2023-10-20 PROCEDURE — 99239 HOSP IP/OBS DSCHRG MGMT >30: CPT | Performed by: NURSE PRACTITIONER

## 2023-10-20 RX ORDER — CYCLOBENZAPRINE HCL 10 MG
10 TABLET ORAL 3 TIMES DAILY PRN
Qty: 21 TABLET | Refills: 0 | Status: SHIPPED | OUTPATIENT
Start: 2023-10-20 | End: 2023-10-30

## 2023-10-20 RX ORDER — ONDANSETRON 4 MG/1
4 TABLET, ORALLY DISINTEGRATING ORAL EVERY 8 HOURS PRN
Qty: 21 TABLET | Refills: 0 | Status: SHIPPED | OUTPATIENT
Start: 2023-10-20

## 2023-10-20 RX ORDER — ORPHENADRINE CITRATE 30 MG/ML
60 INJECTION INTRAMUSCULAR; INTRAVENOUS ONCE
Status: COMPLETED | OUTPATIENT
Start: 2023-10-20 | End: 2023-10-20

## 2023-10-20 RX ORDER — PREDNISONE 10 MG/1
TABLET ORAL
Qty: 30 TABLET | Refills: 0 | Status: SHIPPED | OUTPATIENT
Start: 2023-10-20

## 2023-10-20 RX ORDER — KETOROLAC TROMETHAMINE 30 MG/ML
30 INJECTION, SOLUTION INTRAMUSCULAR; INTRAVENOUS ONCE
Status: COMPLETED | OUTPATIENT
Start: 2023-10-20 | End: 2023-10-20

## 2023-10-20 RX ORDER — KETOROLAC TROMETHAMINE 10 MG/1
10 TABLET, FILM COATED ORAL EVERY 6 HOURS PRN
Qty: 20 TABLET | Refills: 0 | Status: SHIPPED | OUTPATIENT
Start: 2023-10-20 | End: 2024-10-19

## 2023-10-20 RX ADMIN — DEXAMETHASONE SODIUM PHOSPHATE 6 MG: 10 INJECTION, SOLUTION INTRAMUSCULAR; INTRAVENOUS at 00:36

## 2023-10-20 RX ADMIN — NIFEDIPINE 60 MG: 30 TABLET, FILM COATED, EXTENDED RELEASE ORAL at 09:12

## 2023-10-20 RX ADMIN — TIZANIDINE 2 MG: 2 TABLET ORAL at 05:23

## 2023-10-20 RX ADMIN — OXYCODONE AND ACETAMINOPHEN 2 TABLET: 5; 325 TABLET ORAL at 03:43

## 2023-10-20 RX ADMIN — GABAPENTIN 300 MG: 300 CAPSULE ORAL at 09:12

## 2023-10-20 RX ADMIN — KETOROLAC TROMETHAMINE 30 MG: 30 INJECTION INTRAMUSCULAR; INTRAVENOUS at 10:33

## 2023-10-20 RX ADMIN — DEXAMETHASONE SODIUM PHOSPHATE 6 MG: 10 INJECTION, SOLUTION INTRAMUSCULAR; INTRAVENOUS at 10:25

## 2023-10-20 RX ADMIN — SODIUM CHLORIDE, PRESERVATIVE FREE 10 ML: 5 INJECTION INTRAVENOUS at 09:13

## 2023-10-20 RX ADMIN — ORPHENADRINE CITRATE 60 MG: 60 INJECTION INTRAMUSCULAR; INTRAVENOUS at 10:34

## 2023-10-20 RX ADMIN — PANTOPRAZOLE SODIUM 40 MG: 40 TABLET, DELAYED RELEASE ORAL at 05:23

## 2023-10-20 ASSESSMENT — PAIN DESCRIPTION - LOCATION
LOCATION: BACK
LOCATION: BACK
LOCATION: HIP;LEG

## 2023-10-20 ASSESSMENT — PAIN DESCRIPTION - DESCRIPTORS
DESCRIPTORS: ACHING

## 2023-10-20 ASSESSMENT — PAIN DESCRIPTION - ONSET
ONSET: ON-GOING

## 2023-10-20 ASSESSMENT — PAIN DESCRIPTION - ORIENTATION
ORIENTATION: RIGHT
ORIENTATION: RIGHT
ORIENTATION: LOWER

## 2023-10-20 ASSESSMENT — PAIN DESCRIPTION - FREQUENCY
FREQUENCY: CONTINUOUS

## 2023-10-20 ASSESSMENT — PAIN SCALES - GENERAL
PAINLEVEL_OUTOF10: 3
PAINLEVEL_OUTOF10: 4
PAINLEVEL_OUTOF10: 5
PAINLEVEL_OUTOF10: 4
PAINLEVEL_OUTOF10: 7
PAINLEVEL_OUTOF10: 7

## 2023-10-20 ASSESSMENT — PAIN DESCRIPTION - DIRECTION: RADIATING_TOWARDS: RIGHT LEG

## 2023-10-20 ASSESSMENT — PAIN - FUNCTIONAL ASSESSMENT
PAIN_FUNCTIONAL_ASSESSMENT: ACTIVITIES ARE NOT PREVENTED

## 2023-10-20 ASSESSMENT — PAIN DESCRIPTION - PAIN TYPE
TYPE: CHRONIC PAIN;ACUTE PAIN
TYPE: ACUTE PAIN;CHRONIC PAIN
TYPE: ACUTE PAIN;CHRONIC PAIN

## 2023-10-20 NOTE — PLAN OF CARE
Problem: Discharge Planning  Goal: Discharge to home or other facility with appropriate resources  10/19/2023 2347 by Stephanie Riley RN  Outcome: Progressing  Flowsheets (Taken 10/19/2023 2000)  Discharge to home or other facility with appropriate resources:   Identify barriers to discharge with patient and caregiver   Arrange for needed discharge resources and transportation as appropriate   Identify discharge learning needs (meds, wound care, etc)   Refer to discharge planning if patient needs post-hospital services based on physician order or complex needs related to functional status, cognitive ability or social support system  10/19/2023 1532 by Maral Bajwa RN  Outcome: Progressing  Flowsheets  Taken 10/19/2023 1531  Discharge to home or other facility with appropriate resources:   Identify barriers to discharge with patient and caregiver   Identify discharge learning needs (meds, wound care, etc)   Arrange for needed discharge resources and transportation as appropriate  Taken 10/19/2023 1423  Discharge to home or other facility with appropriate resources:   Identify barriers to discharge with patient and caregiver   Arrange for needed discharge resources and transportation as appropriate   Identify discharge learning needs (meds, wound care, etc)     Problem: Pain  Goal: Verbalizes/displays adequate comfort level or baseline comfort level  10/19/2023 2347 by Stephanie Riley RN  Outcome: Progressing  10/19/2023 1532 by Maral Bajwa RN  Outcome: Progressing     Problem: Safety - Adult  Goal: Free from fall injury  Outcome: Progressing
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
## 2023-10-20 NOTE — PROGRESS NOTES
Transitions of Care Pharmacy Service   Medication Review    The patient's list of current home medications has been reviewed. Source(s) of information: [email or web address].com, [employer] (called to confirm prescription history), Care Everywhere    Based on information provided by the above source(s), I have updated the patient's home med list as described below. I changed or updated the following medications on the patient's home medication list:  Removed [DISCONTINUED] ketoconazole (NIZORAL) 2 % shampoo, ketoconazole 2 % shampoo    [DISCONTINUED] sildenafil (VIAGRA) 50 MG tablet, Take 0.5 tablets by mouth as needed for Erectile Dysfunction   Adjusted   lisinopril (PRINIVIL;ZESTRIL) 40 MG tablet, TAKE 1 TABLET BY MOUTH EVERY DAY (Patient not taking: Reported on 10/20/2023)  NIFEdipine (ADALAT CC) 60 MG extended release tablet, Take 1 tablet by mouth daily (Patient not taking: Reported on 10/20/2023)  chlorthalidone (HYGROTON) 25 MG tablet, TAKE 1 TABLET BY MOUTH EVERY DAY (Patient not taking: Reported on 10/20/2023)  tamsulosin (FLOMAX) 0.4 MG capsule, TAKE 1 CAPSULE BY MOUTH EVERY DAY (Patient not taking: Reported on 10/20/2023)  simvastatin (ZOCOR) 80 MG tablet, Take 1 tablet by mouth nightly (Patient not taking: Reported on 10/20/2023)     PROVIDER ACTION REQUESTED  Medications that need to be addressed by a physician/nurse practitioner: N/A    Please feel free to call me with any questions about this encounter. Thank you.     Dianna Lyons [facility]   Transitions of Care Pharmacy Service  Phone:  [phone]  Fax: [phone]    Electronically signed by Dianna Lyons [facility] on 10/20/2023 at 11:21 AM     Medications Prior to Admission:   zolpidem (AMBIEN CR) 12.5 MG extended release tablet, TAKE 1 TABLET BY MOUTH NIGHTLY AS NEEDED FOR SLEEP FOR UP TO 30 DAYS.  tadalafil (CIALIS) 20 MG tablet, Take 1 tablet by mouth daily as needed for Erectile Dysfunction  chlorthalidone (HYGROTON) 25 MG tablet, TAKE 1 TABLET BY

## 2023-10-20 NOTE — PROGRESS NOTES
CLINICAL PHARMACY NOTE: MEDS TO BEDS    Total # of Prescriptions Filled: 3   The following medications were delivered to the patient:  Ketorolac 10mg  Cyclobenzaprine 10mg  Ondansetron 4mg    Additional Documentation:      Prednisone 10mg too soon to fill

## 2023-10-20 NOTE — PROGRESS NOTES
[facility]  Office: [address], DO, Elisabet Meade, DO, [address], DO, Sweta Dobbs, DO, Severiano Silvers, MD, Eric Thurman MD, Gunner Jacques MD, Serene Kelley MD,  Nicolas Davies MD, Fernandez Burns MD, Chino Urias DO, Yovana Hernández MD,  Shellie Sheets MD, Jose Nunez MD, Joni Calhoun DO, Melly Escobedo MD,  Lesli Fletcher DO, Jose Coker MD, Noah Ochoa MD, Wayne Deluca MD, Tang Blackman MD,  Junaid May MD, Aisha Poon MD, Asha Cornell MD, Laura Prado MD, Shima Aldrich DO, Lester Morton MD,  Chana Wiley MD, Tank Dumont MD, Randa Grimaldo, CNP,  Marie Bobby, CNP,, [place], CNP,  Juancarlos Grant, [facility], Tito Alpers, CNP, Peggy Rivera, CNP, Yuridia Buck, CNP, Serjio Mendoza, CNP, Miguel Gaona, CNP, Maribel Palmer, CNP, Nicolas Maldonado, CNS, Tiffanie Gordon CNP, Severo Jessies, [address] [place] [address] 28    Progress Note    10/20/2023    10:11 AM    Name:   Bernadette Valderrama  MRN:     [id number]     Acct:      [de-identified]   Room:   2002/2002-02  IP Day:  0  Admit Date:  10/19/2023  6:28 AM    PCP:   GAGE Durbin CNP  Code Status:  Full Code    Subjective:     C/C:   Chief Complaint   Patient presents with    Back Pain     Lower right back radiating down leg. Seen Monday for the same issue. Leg Pain     Interval History Status: significantly improved. Significant improvement in condition overnight. Patient is ambulating without difficulty and reports that his back pain is \"90% better\". MRI results were reviewed with the patient and he is advised to follow-up with a spinal surgeon. He adamantly refuses this and advised he is enrolled in pain management and already has an appointment for 2 weeks from now. Surgical options were discussed with the patient and he again adamantly refuses.   Plan for discharge with outpatient follow-up at -240 ml       Labs:  Hematology:  Recent Labs     10/19/23  0738 10/20/23  0544   WBC 14.3* 13.5*   RBC 4.52 4.53   HGB 12.7* 12.7*   HCT 38.4* 38.7*   MCV 85.0 85.4   MCH 28.1 28.0   MCHC 33.1 32.8   RDW 12.2 12.1    250   MPV 10.4 10.3   SEDRATE 26*  --      Chemistry:  Recent Labs     10/19/23  0738 10/20/23  0544    134*   K 3.6* 4.3   CL 97* 99   CO2 23 23   GLUCOSE 208* 214*   BUN 24* 23   CREATININE 1.0 1.0   MG 1.9  --    ANIONGAP 15 12   LABGLOM >60 >60   CALCIUM 9.4 9.3     Recent Labs     10/19/23  0738   PROT 7.6   LABALBU 4.1   AST 19   ALT 19   ALKPHOS 78   BILITOT 0.9     ABG:No results found for: \"POCPH\", \"PHART\", \"PH\", \"POCPCO2\", \"GIA5CRQ\", \"PCO2\", \"POCPO2\", \"PO2ART\", \"PO2\", \"POCHCO3\", \"HJL7BPG\", \"HCO3\", \"NBEA\", \"PBEA\", \"BEART\", \"BE\", \"THGBART\", \"THB\", \"ABG1KQX\", \"HSGJ8LDE\", \"D9YIAZTT\", \"O2SAT\", \"FIO2\"  Lab Results   Component Value Date/Time    SPECIAL NOT REPORTED 02/28/2018 07:30 AM     Lab Results   Component Value Date/Time    CULTURE NO GROWTH 6 DAYS 02/28/2018 07:30 AM    CULTURE  02/28/2018 07:30 AM     Performed at [address] [facility], [address] [phone]       Radiology:  MRI LUMBAR SPINE WO CONTRAST    Result Date: 10/19/2023  Multilevel degenerative change with foraminal narrowing and lateral recess narrowing as described above. CT LUMBAR SPINE WO CONTRAST    Result Date: 10/19/2023  Advanced arthritic changes in the lumbar spine with soft tissue canal stenosis throughout much of the lumbar spine due to multilevel disc protrusions and ligamentous thickening. No acute fracture or traumatic malalignment. CT HIP RIGHT WO CONTRAST    Result Date: 10/19/2023  1. No CT evidence for acute fracture or dislocation. Mild bilateral hip osteoarthrosis. If pain persists, consider further evaluation with MRI assuming there are no contraindications to MRI. 2. Partial fusion of the left SI joint. 3. Enlarged prostate.  4. Moderate midline fat containing

## 2023-10-20 NOTE — PLAN OF CARE
Problem: Discharge Planning  Goal: Discharge to home or other facility with appropriate resources  10/20/2023 1008 by Robb Russo RN  Outcome: Adequate for Discharge  Flowsheets (Taken 10/20/2023 0920)  Discharge to home or other facility with appropriate resources:   Identify barriers to discharge with patient and caregiver   Arrange for needed discharge resources and transportation as appropriate   Identify discharge learning needs (meds, wound care, etc)   Refer to discharge planning if patient needs post-hospital services based on physician order or complex needs related to functional status, cognitive ability or social support system  10/19/2023 2347 by Jd Rebollar RN  Outcome: Progressing  Flowsheets (Taken 10/19/2023 2000)  Discharge to home or other facility with appropriate resources:   Identify barriers to discharge with patient and caregiver   Arrange for needed discharge resources and transportation as appropriate   Identify discharge learning needs (meds, wound care, etc)   Refer to discharge planning if patient needs post-hospital services based on physician order or complex needs related to functional status, cognitive ability or social support system     Problem: Pain  Goal: Verbalizes/displays adequate comfort level or baseline comfort level  10/20/2023 1008 by Robb Russo RN  Outcome: Adequate for Discharge  10/19/2023 2347 by Jd Rebollar RN  Outcome: Progressing     Problem: Safety - Adult  Goal: Free from fall injury  10/20/2023 1008 by Robb Russo RN  Outcome: Adequate for Discharge  10/19/2023 2347 by Jd Rebollar RN  Outcome: Progressing     Problem: Chronic Conditions and Co-morbidities  Goal: Patient's chronic conditions and co-morbidity symptoms are monitored and maintained or improved  Outcome: Adequate for Discharge  Flowsheets (Taken 10/20/2023 0920)  Care Plan - Patient's Chronic Conditions and Co-Morbidity Symptoms are Monitored and Maintained or Improved:

## 2023-10-20 NOTE — DISCHARGE INSTR - COC
Continuity of Care Form    Patient Name: Latisha Robledo   :  1955  MRN:  [id number]    Admit date:  10/19/2023  Discharge date:  ***    Code Status Order: Full Code   Advance Directives:     Admitting Physician:  Fadi Rosales MD  PCP: GAGE Miramontes CNP    Discharging Nurse: [facility] Unit/Room#:   Discharging Unit Phone Number: ***    Emergency Contact:   Extended Emergency Contact Information  Primary Emergency Contact: Josef Cortez [facility] [place] [address] [address] Phone: [phone]  Work Phone: [phone]  Mobile Phone: [phone]  Relation: Brother/Sister    Past Surgical History:  Past Surgical History:   Procedure Laterality Date    ANKLE FRACTURE SURGERY Right     ORIF    CARPAL TUNNEL RELEASE Right 2012    COLONOSCOPY      COLONOSCOPY  2019    tubular adenoma    COLONOSCOPY N/A 2019    COLONOSCOPY WITH BIOPSY performed by Marcus Wade MD at [address] Right     inguinal    SHOULDER ARTHROSCOPY Right 3/22/2019    SHOULDER ARTHROSCOPY ROTATOR CUFF REPAIR WITH BICEP TENOTOMY, EXTENSIVE DEBRIDEMENT performed by Christian Garza MD at [address] [employer]       Immunization History:   Immunization History   Administered Date(s) Administered    COVID-19, MODERNA BLUE border, Primary or Immunocompromised, (age 12y+), IM, 100 mcg/0.5mL 2021, 2021, 2021    Influenza 10/22/2012    Influenza Vaccine, unspecified formulation 2017    Influenza, AFLURIA (age 1 yrs+), FLUZONE, (age 10 mo+), MDV, 0.5mL 10/18/2021    Influenza, High Dose (Fluzone 65 yrs and older) 2019    Pneumococcal, PPSV23, PNEUMOVAX 23, (age 2y+), SC/IM, 0.5mL 2017       Active Problems:  Patient Active Problem List   Diagnosis Code    Chronic pain syndrome G89.4    DJD (degenerative joint disease), 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      Update Admission H&P: {CHP DME Changes in UTEVS:[id number]}    PHYSICIAN SIGNATURE:  {Esignature:[id number]}

## 2023-10-20 NOTE — DISCHARGE SUMMARY
[facility]  Office: [address], DO, Genoveva Falcon, DO, Mulugeta Moncada, DO, Abelardo Dobbs, DO, Juan Negrete MD, Antonio Kitchen MD, Sylvia Montoya MD, Telly Adamson MD,  Pratima Grier MD, Nicholas Isidro MD, Sathish Delgadillo, DO, Oskar Smallwood MD,  Isra Zamorano DO, Bev Austin MD, Reina Edmonds MD, Yudy Mendez DO, Bita Uribe MD,  Peter Dumont DO, Rex Jimenes MD, Brittany Azevedo MD, oRmaine Perez MD, Denise Chapin MD,  Nelson Hastings MD, Eze Allen MD, Bettye Olivares MD, Em Talamantes MD, Misti Gray DO, Sincere Whitfield MD,  Maida Razo MD, Yamilka Arboleda MD, Bandar Wilkerson, CNP,  Shelby Dudley, CNP,, Pedro Wilson, CNP,  Sharon Ward, MARC, Simi Bennett, CNP, Kevin Solis, CNP, Joann Becerra, CNP, Faiza Hollins, CNP, Javier Miller, CNP, Gabe Oliva, CNP, Jayden Vickers, CNS, Earl Patel, CNP, [facility], [facility]    Discharge Summary     Patient ID: Rhina Cutler  :  1955   MRN: [id number]     ACCOUNT:  [de-identified]   Patient's PCP: GAGE Holder CNP  Admit Date: 10/19/2023   Discharge Date: 10/20/2023     Length of Stay: 0  Code Status:  Full Code  Admitting Physician: Sylvia Montoya MD  Discharge Physician: GAGE Sorto NP     Active Discharge Diagnoses:     Hospital Problem Lists:  Principal Problem:    Acute back pain with radiculopathy  Active Problems:    Chronic pain syndrome    Unstable gait    Type 2 diabetes mellitus without complication [facility])    Essential hypertension    Mixed hyperlipidemia    Gastroesophageal reflux disease  Resolved Problems:    * No resolved hospital problems.  *      Admission Condition:  fair     Discharged Condition: good    Hospital Stay:     Hospital Course:  Rhina Cutler is a 70 y.o. male who was admitted for the management of  Acute back pain with radiculopathy , presented to ER with Back 10/20/2023 05:44 AM    BUNCRER 23 10/20/2023 05:44 AM    CALCIUM 9.3 10/20/2023 05:44 AM    LABGLOM >60 10/20/2023 05:44 AM    GFRAA >60 03/22/2021 08:30 AM    GFR      03/22/2021 08:30 AM    GFR NOT REPORTED 03/22/2021 08:30 AM        Radiology:  MRI LUMBAR SPINE WO CONTRAST    Result Date: 10/19/2023  Multilevel degenerative change with foraminal narrowing and lateral recess narrowing as described above. CT LUMBAR SPINE WO CONTRAST    Result Date: 10/19/2023  Advanced arthritic changes in the lumbar spine with soft tissue canal stenosis throughout much of the lumbar spine due to multilevel disc protrusions and ligamentous thickening. No acute fracture or traumatic malalignment. CT HIP RIGHT WO CONTRAST    Result Date: 10/19/2023  1. No CT evidence for acute fracture or dislocation. Mild bilateral hip osteoarthrosis. If pain persists, consider further evaluation with MRI assuming there are no contraindications to MRI. 2. Partial fusion of the left SI joint. 3. Enlarged prostate. 4. Moderate midline fat containing periumbilical hernia. 5. Moderate stool burden. CT ABDOMEN PELVIS W IV CONTRAST Additional Contrast? None    Result Date: 10/19/2023  1. No acute abdominal or pelvic findings. 2.  Prostatomegaly. 3.  Fat containing umbilical hernia and left inguinal hernia       Consultations:    Consults:     Final Specialist Recommendations/Findings:   IP CONSULT TO HOSPITALIST      The patient was seen and examined on day of discharge and this discharge summary is in conjunction with any daily progress note from day of discharge. Discharge plan:     Disposition: Home    Physician Follow Up:     GAGE Baptiste - CNP  [address] [place] [address]  [phone]    Follow up         Requiring Further Evaluation/Follow Up POST HOSPITALIZATION/Incidental Findings:     Diet: regular diet    Activity: As tolerated    Instructions to Patient: Take the steroids exactly as prescribed.   Take the pain

## 2023-10-20 NOTE — PROGRESS NOTES
Patient  was discharged to [facility] with all [facility] belongings in stable condition. Meds to bed delivered prior to discharge.  Patient understood and signed AVS.

## (undated) DEVICE — STRAP,POSITIONING,KNEE/BODY,FOAM,4X60": Brand: MEDLINE

## (undated) DEVICE — BLADE SAW RESECTOR CUT 4MM

## (undated) DEVICE — GLOVE ORANGE PI 7   MSG9070

## (undated) DEVICE — KIT POS ULT SHLDR PT CARE REHAB SLNG

## (undated) DEVICE — DRAPE,REIN 53X77,STERILE: Brand: MEDLINE

## (undated) DEVICE — BANDAGE,SELF ADHRNT,COFLEX,4"X5YD,STRL: Brand: COLABEL

## (undated) DEVICE — SUTURE PROL SZ 3-0 L18IN NONABSORBABLE BLU L30MM FS-1 3/8 8663G

## (undated) DEVICE — CANNULA ARTHSCP L4CM DIA8MM PASSPRT BTTN

## (undated) DEVICE — MARKER,SKIN,WI/RULER AND LABELS: Brand: MEDLINE

## (undated) DEVICE — GLOVE ORANGE PI 7 1/2   MSG9075

## (undated) DEVICE — 3M™ STERI-DRAPE™ U-DRAPE 1015: Brand: STERI-DRAPE™

## (undated) DEVICE — POUCH FLD 36X29IN SHLDR HVY LO GLARE MATTE FINISH FLM 2 SUCT

## (undated) DEVICE — IMMOBILIZER SHLDR L10.5-17IN D7IN SLNG W/ 15DEG ABD PLLW

## (undated) DEVICE — SYRINGE, LUER LOCK, 60ML: Brand: MEDLINE

## (undated) DEVICE — SUTURE FIBERWIRE SZ 2 L38IN NONABSORBABLE BLU L36.6MM 1/2 AR7202

## (undated) DEVICE — BASIN EMSIS 700ML GRAPHITE PLAS KID SHP GRAD

## (undated) DEVICE — GAUZE,SPONGE,4"X4",16PLY,STRL,LF,10/TRAY: Brand: MEDLINE

## (undated) DEVICE — NEEDLE SPNL 18GA L3.5IN W/ QNCKE SHARPER BVL DURA CLICK

## (undated) DEVICE — TUBING, SUCTION, 3/16" X 10', STRAIGHT: Brand: MEDLINE

## (undated) DEVICE — [ARTHROSCOPY PUMP,  DO NOT USE IF PACKAGE IS DAMAGED,  KEEP DRY,  KEEP AWAY FROM SUNLIGHT,  PROTECT FROM HEAT AND RADIOACTIVE SOURCES.]: Brand: FLOSTEADY

## (undated) DEVICE — GOWN,AURORA,NONREINFORCED,LARGE: Brand: MEDLINE

## (undated) DEVICE — SUTURE FIBERWIRE SZ 2 W/ TAPERED NEEDLE BLUE L38IN NONABSORB BLU L26.5MM 1/2 CIRCLE AR7200

## (undated) DEVICE — SOLUTION IV IRRIG LACTATED RINGERS 3000ML 2B7487

## (undated) DEVICE — DISC ABSRB YEL FLR POLYETH MGMT SUCT QUICKSUITE

## (undated) DEVICE — 1010 S-DRAPE TOWEL DRAPE 10/BX: Brand: STERI-DRAPE™

## (undated) DEVICE — 4-PORT MANIFOLD: Brand: NEPTUNE 2

## (undated) DEVICE — NEEDLE SUT PASS FOR ROT CUF LABRAL REP MULTFI SCORPION

## (undated) DEVICE — DRESSING TRNSPAR W5XL4.5IN FLM SHT SEMIPERMEABLE WIND

## (undated) DEVICE — 3M™ WARMING BLANKET, LOWER BODY, 10 PER CASE, 42568: Brand: BAIR HUGGER™

## (undated) DEVICE — HYBRID TUBING WITH CO2 CONNECTION FOR OLYMPUS 140/160/180/190 SERIES GI ENDOSCOPES WITH OLYMPUS AFU-100 , OLYMPUS OFP: Brand: ENDOGATOR HYBRID IRRIGATION TUBING

## (undated) DEVICE — SHEET, ORTHO, SPLIT, STERILE: Brand: MEDLINE

## (undated) DEVICE — POUCH INSTR W6.75XL11.5IN FRST 2 PKT ADH FOR ORTH AND

## (undated) DEVICE — COVER,MAYO STAND,XL,STERILE: Brand: MEDLINE

## (undated) DEVICE — FORCEPS BX L240CM JAW DIA2.4MM ORNG L CAP W/ NDL DISP RAD

## (undated) DEVICE — JELLY,LUBE,STERILE,FLIP TOP,TUBE,2-OZ: Brand: MEDLINE

## (undated) DEVICE — Device

## (undated) DEVICE — 90-S, SUCTION PROBE, NON-BENDABLE, MAX CUT LEVEL 11: Brand: SERFAS ENERGY

## (undated) DEVICE — TUBING, SUCTION, 1/4" X 12', STRAIGHT: Brand: MEDLINE

## (undated) DEVICE — SYRINGE MED 50ML LUERLOCK TIP

## (undated) DEVICE — SUTURE PROL SZ 1 L30IN NONABSORBABLE BLU L36MM CT-1 1/2 CIR 8425H

## (undated) DEVICE — SOLUTION IV IRRIG WATER 1000ML POUR BRL 2F7114

## (undated) DEVICE — STRIP,CLOSURE,WOUND,MEDI-STRIP,1/2X4: Brand: MEDLINE

## (undated) DEVICE — DRESSING,GAUZE,XEROFORM,CURAD,1"X8",ST: Brand: CURAD

## (undated) DEVICE — GOWN,SIRUS,NONRNF,SETINSLV,XL,20/CS: Brand: MEDLINE